# Patient Record
Sex: FEMALE | Race: WHITE | Employment: OTHER | URBAN - METROPOLITAN AREA
[De-identification: names, ages, dates, MRNs, and addresses within clinical notes are randomized per-mention and may not be internally consistent; named-entity substitution may affect disease eponyms.]

---

## 2017-11-09 ENCOUNTER — HOSPITAL ENCOUNTER (OUTPATIENT)
Dept: WOMENS IMAGING | Age: 57
Discharge: HOME | End: 2017-11-09
Attending: FAMILY MEDICINE | Admitting: FAMILY MEDICINE

## 2017-11-09 DIAGNOSIS — Z12.31 VISIT FOR SCREENING MAMMOGRAM: ICD-10-CM

## 2018-11-22 LAB
CHOLESTEROL, TOTAL: 216 MG/DL
CHOLESTEROL/HDL RATIO: ABNORMAL
HDLC SERPL-MCNC: 94 MG/DL (ref 35–70)
LDL CHOLESTEROL CALCULATED: 106 MG/DL (ref 0–160)
TRIGL SERPL-MCNC: 82 MG/DL
TSH SERPL DL<=0.05 MIU/L-ACNC: 1.12 UIU/ML
VLDLC SERPL CALC-MCNC: 16 MG/DL

## 2019-05-23 ENCOUNTER — HOSPITAL ENCOUNTER (OUTPATIENT)
Dept: WOMENS IMAGING | Age: 59
Discharge: HOME | End: 2019-05-23
Payer: COMMERCIAL

## 2019-05-23 DIAGNOSIS — Z12.31 VISIT FOR SCREENING MAMMOGRAM: ICD-10-CM

## 2019-05-23 PROCEDURE — 77063 BREAST TOMOSYNTHESIS BI: CPT

## 2019-05-28 ENCOUNTER — HOSPITAL ENCOUNTER (OUTPATIENT)
Age: 59
Setting detail: SPECIMEN
Discharge: HOME | End: 2019-05-28
Payer: COMMERCIAL

## 2019-05-28 PROCEDURE — 87077 CULTURE AEROBIC IDENTIFY: CPT

## 2019-05-28 PROCEDURE — 87071 CULTURE AEROBIC QUANT OTHER: CPT

## 2019-05-28 PROCEDURE — 87073 CULTURE BACTERIA ANAEROBIC: CPT

## 2019-05-28 PROCEDURE — 87186 SC STD MICRODIL/AGAR DIL: CPT

## 2019-05-31 LAB
CULTURE: ABNORMAL
Lab: ABNORMAL
SPECIMEN: ABNORMAL

## 2019-07-02 ENCOUNTER — ANESTHESIA EVENT (OUTPATIENT)
Dept: OPERATING ROOM | Age: 59
End: 2019-07-02
Payer: COMMERCIAL

## 2019-07-02 NOTE — ANESTHESIA PRE PROCEDURE
Endo/Other:    (+) hyperthyroidism::., .                 Abdominal:           Vascular: negative vascular ROS. Anesthesia Plan      MAC     ASA 2       Induction: intravenous. Anesthetic plan and risks discussed with patient. Use of blood products discussed with patient whom. DEWEY Schroeder - CRNA   7/2/2019       Pre Anesthesia Assessment complete.  Chart reviewed on 7/2/2019

## 2019-07-03 ENCOUNTER — HOSPITAL ENCOUNTER (OUTPATIENT)
Age: 59
Setting detail: OUTPATIENT SURGERY
Discharge: HOME | End: 2019-07-03
Attending: INTERNAL MEDICINE | Admitting: INTERNAL MEDICINE
Payer: COMMERCIAL

## 2019-07-03 ENCOUNTER — ANESTHESIA (OUTPATIENT)
Dept: OPERATING ROOM | Age: 59
End: 2019-07-03
Payer: COMMERCIAL

## 2019-07-03 VITALS
OXYGEN SATURATION: 100 % | WEIGHT: 160 LBS | SYSTOLIC BLOOD PRESSURE: 114 MMHG | DIASTOLIC BLOOD PRESSURE: 78 MMHG | BODY MASS INDEX: 24.25 KG/M2 | HEART RATE: 66 BPM | TEMPERATURE: 98.1 F | RESPIRATION RATE: 16 BRPM | HEIGHT: 68 IN

## 2019-07-03 VITALS — OXYGEN SATURATION: 98 % | DIASTOLIC BLOOD PRESSURE: 58 MMHG | SYSTOLIC BLOOD PRESSURE: 94 MMHG

## 2019-07-03 PROCEDURE — 2709999900 HC NON-CHARGEABLE SUPPLY: Performed by: INTERNAL MEDICINE

## 2019-07-03 PROCEDURE — 3700000000 HC ANESTHESIA ATTENDED CARE: Performed by: INTERNAL MEDICINE

## 2019-07-03 PROCEDURE — 7100000011 HC PHASE II RECOVERY - ADDTL 15 MIN: Performed by: INTERNAL MEDICINE

## 2019-07-03 PROCEDURE — 7100000010 HC PHASE II RECOVERY - FIRST 15 MIN: Performed by: INTERNAL MEDICINE

## 2019-07-03 PROCEDURE — 3700000001 HC ADD 15 MINUTES (ANESTHESIA): Performed by: INTERNAL MEDICINE

## 2019-07-03 PROCEDURE — 6360000002 HC RX W HCPCS: Performed by: NURSE ANESTHETIST, CERTIFIED REGISTERED

## 2019-07-03 PROCEDURE — 2580000003 HC RX 258: Performed by: INTERNAL MEDICINE

## 2019-07-03 PROCEDURE — 88305 TISSUE EXAM BY PATHOLOGIST: CPT

## 2019-07-03 PROCEDURE — 3609010600 HC COLONOSCOPY POLYPECTOMY SNARE/COLD BIOPSY: Performed by: INTERNAL MEDICINE

## 2019-07-03 PROCEDURE — 2500000003 HC RX 250 WO HCPCS: Performed by: NURSE ANESTHETIST, CERTIFIED REGISTERED

## 2019-07-03 RX ORDER — SODIUM CHLORIDE, SODIUM LACTATE, POTASSIUM CHLORIDE, CALCIUM CHLORIDE 600; 310; 30; 20 MG/100ML; MG/100ML; MG/100ML; MG/100ML
INJECTION, SOLUTION INTRAVENOUS CONTINUOUS
Status: DISCONTINUED | OUTPATIENT
Start: 2019-07-03 | End: 2019-07-03 | Stop reason: HOSPADM

## 2019-07-03 RX ORDER — PROPOFOL 10 MG/ML
INJECTION, EMULSION INTRAVENOUS PRN
Status: DISCONTINUED | OUTPATIENT
Start: 2019-07-03 | End: 2019-07-03 | Stop reason: SDUPTHER

## 2019-07-03 RX ORDER — LIDOCAINE HYDROCHLORIDE 20 MG/ML
INJECTION, SOLUTION EPIDURAL; INFILTRATION; INTRACAUDAL; PERINEURAL PRN
Status: DISCONTINUED | OUTPATIENT
Start: 2019-07-03 | End: 2019-07-03 | Stop reason: SDUPTHER

## 2019-07-03 RX ADMIN — PROPOFOL 50 MG: 10 INJECTION, EMULSION INTRAVENOUS at 08:34

## 2019-07-03 RX ADMIN — PROPOFOL 50 MG: 10 INJECTION, EMULSION INTRAVENOUS at 08:27

## 2019-07-03 RX ADMIN — LIDOCAINE HYDROCHLORIDE 200 MG: 20 INJECTION, SOLUTION EPIDURAL; INFILTRATION; INTRACAUDAL; PERINEURAL at 08:08

## 2019-07-03 RX ADMIN — PROPOFOL 50 MG: 10 INJECTION, EMULSION INTRAVENOUS at 08:23

## 2019-07-03 RX ADMIN — PROPOFOL 100 MG: 10 INJECTION, EMULSION INTRAVENOUS at 08:08

## 2019-07-03 RX ADMIN — PROPOFOL 50 MG: 10 INJECTION, EMULSION INTRAVENOUS at 08:13

## 2019-07-03 RX ADMIN — PROPOFOL 50 MG: 10 INJECTION, EMULSION INTRAVENOUS at 08:38

## 2019-07-03 RX ADMIN — PROPOFOL 20 MG: 10 INJECTION, EMULSION INTRAVENOUS at 08:40

## 2019-07-03 RX ADMIN — PROPOFOL 50 MG: 10 INJECTION, EMULSION INTRAVENOUS at 08:42

## 2019-07-03 RX ADMIN — PROPOFOL 50 MG: 10 INJECTION, EMULSION INTRAVENOUS at 08:30

## 2019-07-03 RX ADMIN — SODIUM CHLORIDE, POTASSIUM CHLORIDE, SODIUM LACTATE AND CALCIUM CHLORIDE: 600; 310; 30; 20 INJECTION, SOLUTION INTRAVENOUS at 07:51

## 2019-07-03 RX ADMIN — PROPOFOL 50 MG: 10 INJECTION, EMULSION INTRAVENOUS at 08:17

## 2019-07-03 RX ADMIN — SODIUM CHLORIDE, POTASSIUM CHLORIDE, SODIUM LACTATE AND CALCIUM CHLORIDE: 600; 310; 30; 20 INJECTION, SOLUTION INTRAVENOUS at 08:48

## 2019-07-03 ASSESSMENT — PAIN SCALES - GENERAL
PAINLEVEL_OUTOF10: 0
PAINLEVEL_OUTOF10: 0

## 2019-07-03 ASSESSMENT — PAIN - FUNCTIONAL ASSESSMENT: PAIN_FUNCTIONAL_ASSESSMENT: 0-10

## 2019-07-03 NOTE — BRIEF OP NOTE
Brief Postoperative Note  ______________________________________________________________    Patient: Dana Han  YOB: 1960  MRN: [id number]  Date of Procedure: 7/3/2019    Pre-Op Diagnosis: Screening    Post-Op Diagnosis: diminutive polyp bascending       Procedure(s):  COLONOSCOPY POLYPECTOMY SNARE/COLD BIOPSY    Anesthesia: Monitor Anesthesia Care    Surgeon(s):  Catalino Hensley MD        Estimated Blood Loss (mL): less than 50     Complications: None    Specimens:   ID Type Source Tests Collected by Time Destination   A :  Tissue Colon SURGICAL PATHOLOGY Catalino Hensley MD 7/3/2019 9826                Catalino Hensley MD  Date: 7/3/2019  Time: 8:55 AM

## 2019-08-12 RX ORDER — LEVOTHYROXINE SODIUM 112 UG/1
112 TABLET ORAL EVERY MORNING
Qty: 30 TABLET | Refills: 2 | Status: SHIPPED | OUTPATIENT
Start: 2019-08-12 | End: 2019-11-21 | Stop reason: SDUPTHER

## 2019-08-12 RX ORDER — OXYBUTYNIN CHLORIDE 15 MG/1
30 TABLET, EXTENDED RELEASE ORAL EVERY MORNING
Qty: 60 TABLET | Refills: 2 | Status: SHIPPED | OUTPATIENT
Start: 2019-08-12 | End: 2019-11-21 | Stop reason: SDUPTHER

## 2019-08-12 NOTE — TELEPHONE ENCOUNTER
----- Message from Juanita Olson sent at 8/12/2019 10:28 AM EDT -----  Contact: PATIENT  FOUND IT!  ----- Message -----  From: Bessie Hardin  Sent: 8/12/2019   9:40 AM EDT  To:  Kera Lima MA    MED REFILL:    SYNTHROID 112 MCG    OXYBUTYNIN ER  15 MG 2QD      PHARMACY: [facility] MAIL ORDER

## 2019-10-06 DIAGNOSIS — E05.00 GRAVES DISEASE: ICD-10-CM

## 2019-10-06 DIAGNOSIS — E53.8 VITAMIN B 12 DEFICIENCY: ICD-10-CM

## 2019-10-06 DIAGNOSIS — N32.81 OVERACTIVE BLADDER: ICD-10-CM

## 2019-11-21 ENCOUNTER — OFFICE VISIT (OUTPATIENT)
Dept: FAMILY MEDICINE CLINIC | Age: 59
End: 2019-11-21
Payer: COMMERCIAL

## 2019-11-21 VITALS
DIASTOLIC BLOOD PRESSURE: 80 MMHG | BODY MASS INDEX: 23.7 KG/M2 | HEIGHT: 68 IN | OXYGEN SATURATION: 97 % | WEIGHT: 156.4 LBS | HEART RATE: 87 BPM | SYSTOLIC BLOOD PRESSURE: 119 MMHG

## 2019-11-21 DIAGNOSIS — E05.00 GRAVES DISEASE: ICD-10-CM

## 2019-11-21 DIAGNOSIS — E53.8 VITAMIN B 12 DEFICIENCY: ICD-10-CM

## 2019-11-21 DIAGNOSIS — Z13.1 SCREENING FOR DIABETES MELLITUS: ICD-10-CM

## 2019-11-21 DIAGNOSIS — N32.81 OVERACTIVE BLADDER: ICD-10-CM

## 2019-11-21 DIAGNOSIS — E89.0 POSTOPERATIVE HYPOTHYROIDISM: Primary | ICD-10-CM

## 2019-11-21 DIAGNOSIS — Z13.220 SCREENING CHOLESTEROL LEVEL: ICD-10-CM

## 2019-11-21 LAB
BILIRUBIN, POC: NORMAL
BLOOD URINE, POC: NORMAL
CLARITY, POC: NORMAL
COLOR, POC: YELLOW
GLUCOSE URINE, POC: NORMAL
KETONES, POC: NORMAL
LEUKOCYTE EST, POC: NORMAL
NITRITE, POC: NORMAL
PH, POC: 5
PROTEIN, POC: NORMAL
SPECIFIC GRAVITY, POC: 1.01
UROBILINOGEN, POC: NORMAL

## 2019-11-21 PROCEDURE — 81002 URINALYSIS NONAUTO W/O SCOPE: CPT | Performed by: FAMILY MEDICINE

## 2019-11-21 PROCEDURE — 90686 IIV4 VACC NO PRSV 0.5 ML IM: CPT | Performed by: FAMILY MEDICINE

## 2019-11-21 PROCEDURE — 90471 IMMUNIZATION ADMIN: CPT | Performed by: FAMILY MEDICINE

## 2019-11-21 PROCEDURE — 99213 OFFICE O/P EST LOW 20 MIN: CPT | Performed by: FAMILY MEDICINE

## 2019-11-21 RX ORDER — OXYBUTYNIN CHLORIDE 15 MG/1
30 TABLET, EXTENDED RELEASE ORAL EVERY MORNING
Qty: 60 TABLET | Refills: 2 | Status: SHIPPED | OUTPATIENT
Start: 2019-11-21 | End: 2019-11-27 | Stop reason: SDUPTHER

## 2019-11-21 RX ORDER — LEVOTHYROXINE SODIUM 112 UG/1
112 TABLET ORAL EVERY MORNING
Qty: 30 TABLET | Refills: 2 | Status: SHIPPED | OUTPATIENT
Start: 2019-11-21 | End: 2019-11-27 | Stop reason: SDUPTHER

## 2019-11-21 ASSESSMENT — PATIENT HEALTH QUESTIONNAIRE - PHQ9
SUM OF ALL RESPONSES TO PHQ QUESTIONS 1-9: 0
1. LITTLE INTEREST OR PLEASURE IN DOING THINGS: 0
SUM OF ALL RESPONSES TO PHQ9 QUESTIONS 1 & 2: 0
SUM OF ALL RESPONSES TO PHQ QUESTIONS 1-9: 0
2. FEELING DOWN, DEPRESSED OR HOPELESS: 0

## 2019-11-21 ASSESSMENT — ENCOUNTER SYMPTOMS
CHEST TIGHTNESS: 0
ABDOMINAL PAIN: 0
COUGH: 0
WHEEZING: 0
RESPIRATORY NEGATIVE: 1
SHORTNESS OF BREATH: 0

## 2019-11-24 LAB
ORGANISM: ABNORMAL
URINE CULTURE, ROUTINE: ABNORMAL

## 2019-11-27 DIAGNOSIS — N32.81 OVERACTIVE BLADDER: ICD-10-CM

## 2019-11-27 DIAGNOSIS — E89.0 POSTOPERATIVE HYPOTHYROIDISM: ICD-10-CM

## 2019-11-27 RX ORDER — OXYBUTYNIN CHLORIDE 15 MG/1
30 TABLET, EXTENDED RELEASE ORAL EVERY MORNING
Qty: 180 TABLET | Refills: 1 | Status: SHIPPED | OUTPATIENT
Start: 2019-11-27 | End: 2020-05-08 | Stop reason: SDUPTHER

## 2019-11-27 RX ORDER — LEVOTHYROXINE SODIUM 112 UG/1
112 TABLET ORAL EVERY MORNING
Qty: 90 TABLET | Refills: 1 | Status: SHIPPED | OUTPATIENT
Start: 2019-11-27 | End: 2020-05-08 | Stop reason: SDUPTHER

## 2019-12-06 DIAGNOSIS — E05.00 GRAVES DISEASE: ICD-10-CM

## 2019-12-06 DIAGNOSIS — Z13.1 SCREENING FOR DIABETES MELLITUS: ICD-10-CM

## 2019-12-06 DIAGNOSIS — E53.8 VITAMIN B 12 DEFICIENCY: ICD-10-CM

## 2019-12-06 LAB
A/G RATIO: 1.3 (ref 1.1–2.2)
ALBUMIN SERPL-MCNC: 4.4 G/DL (ref 3.4–5)
ALP BLD-CCNC: 63 U/L (ref 40–129)
ALT SERPL-CCNC: 14 U/L (ref 10–40)
ANION GAP SERPL CALCULATED.3IONS-SCNC: 15 MMOL/L (ref 3–16)
AST SERPL-CCNC: 19 U/L (ref 15–37)
BILIRUB SERPL-MCNC: 1.2 MG/DL (ref 0–1)
BUN BLDV-MCNC: 18 MG/DL (ref 7–20)
CALCIUM SERPL-MCNC: 9.8 MG/DL (ref 8.3–10.6)
CHLORIDE BLD-SCNC: 101 MMOL/L (ref 99–110)
CHOLESTEROL, TOTAL: 206 MG/DL (ref 0–199)
CO2: 24 MMOL/L (ref 21–32)
CREAT SERPL-MCNC: 0.6 MG/DL (ref 0.6–1.2)
GFR AFRICAN AMERICAN: >60
GFR NON-AFRICAN AMERICAN: >60
GLOBULIN: 3.3 G/DL
GLUCOSE BLD-MCNC: 88 MG/DL (ref 70–99)
HDLC SERPL-MCNC: 92 MG/DL (ref 40–60)
LDL CHOLESTEROL CALCULATED: 103 MG/DL
POTASSIUM SERPL-SCNC: 4.1 MMOL/L (ref 3.5–5.1)
SODIUM BLD-SCNC: 140 MMOL/L (ref 136–145)
T4 FREE: 1.8 NG/DL (ref 0.9–1.8)
TOTAL PROTEIN: 7.7 G/DL (ref 6.4–8.2)
TRIGL SERPL-MCNC: 56 MG/DL (ref 0–150)
TSH SERPL DL<=0.05 MIU/L-ACNC: 0.3 UIU/ML (ref 0.27–4.2)
VITAMIN B-12: 724 PG/ML (ref 211–911)
VLDLC SERPL CALC-MCNC: 11 MG/DL

## 2019-12-12 ENCOUNTER — TELEPHONE (OUTPATIENT)
Dept: FAMILY MEDICINE CLINIC | Age: 59
End: 2019-12-12

## 2020-01-20 ENCOUNTER — OFFICE VISIT (OUTPATIENT)
Dept: FAMILY MEDICINE CLINIC | Age: 60
End: 2020-01-20
Payer: COMMERCIAL

## 2020-01-20 VITALS
OXYGEN SATURATION: 96 % | SYSTOLIC BLOOD PRESSURE: 118 MMHG | DIASTOLIC BLOOD PRESSURE: 74 MMHG | TEMPERATURE: 99.2 F | HEIGHT: 68 IN | BODY MASS INDEX: 23.95 KG/M2 | HEART RATE: 85 BPM | WEIGHT: 158 LBS

## 2020-01-20 PROCEDURE — 99213 OFFICE O/P EST LOW 20 MIN: CPT | Performed by: NURSE PRACTITIONER

## 2020-01-20 RX ORDER — BENZONATATE 100 MG/1
100 CAPSULE ORAL 3 TIMES DAILY PRN
Qty: 20 CAPSULE | Refills: 0 | Status: SHIPPED | OUTPATIENT
Start: 2020-01-20 | End: 2020-05-08 | Stop reason: ALTCHOICE

## 2020-01-20 RX ORDER — AMOXICILLIN AND CLAVULANATE POTASSIUM 875; 125 MG/1; MG/1
1 TABLET, FILM COATED ORAL 2 TIMES DAILY
Qty: 20 TABLET | Refills: 0 | Status: SHIPPED | OUTPATIENT
Start: 2020-01-20 | End: 2020-01-30

## 2020-01-20 RX ORDER — FLUTICASONE PROPIONATE 50 MCG
1 SPRAY, SUSPENSION (ML) NASAL DAILY
Qty: 1 BOTTLE | Refills: 0 | Status: SHIPPED | OUTPATIENT
Start: 2020-01-20 | End: 2020-05-08

## 2020-01-20 ASSESSMENT — ENCOUNTER SYMPTOMS
SINUS PRESSURE: 1
HEARTBURN: 0
COUGH: 1
SHORTNESS OF BREATH: 0
SINUS PAIN: 1
HEMOPTYSIS: 0
VOMITING: 0
CHEST TIGHTNESS: 0
SORE THROAT: 0
DIARRHEA: 0
RHINORRHEA: 1
NAUSEA: 0
WHEEZING: 1

## 2020-01-20 NOTE — PROGRESS NOTES
benzonatate (TESSALON PERLES) 100 MG capsule Take 1 capsule by mouth 3 times daily as needed for Cough 20 capsule 0    levothyroxine (LEVOTHROID) 112 MCG tablet Take 1 tablet by mouth every morning Must make appt for future refills 90 tablet 1    oxybutynin (DITROPAN XL) 15 MG extended release tablet Take 2 tablets by mouth every morning Must make appt for future refills 180 tablet 1    Cyanocobalamin (B-12 PO) Take 1,500 mcg by mouth every morning        No current facility-administered medications for this visit. Past medical, family,surgical history reviewed today. Objective:  /74   Pulse 85   Temp 99.2 °F (37.3 °C)   Ht 5' 8\" (1.727 m)   Wt 158 lb (71.7 kg)   SpO2 96%   BMI 24.02 kg/m²   BP Readings from Last 3 Encounters:   01/20/20 118/74   11/21/19 119/80   07/03/19 (!) 94/58     Wt Readings from Last 3 Encounters:   01/20/20 158 lb (71.7 kg)   11/21/19 156 lb 6.4 oz (70.9 kg)   07/03/19 160 lb (72.6 kg)         Physical Exam  Constitutional:       Appearance: Normal appearance. HENT:      Head: Normocephalic. Right Ear: Tympanic membrane, ear canal and external ear normal.      Left Ear: Tympanic membrane, ear canal and external ear normal.      Nose: Mucosal edema and congestion present. Right Sinus: Frontal sinus tenderness present. No maxillary sinus tenderness. Left Sinus: Frontal sinus tenderness present. No maxillary sinus tenderness. Mouth/Throat:      Lips: Pink. Mouth: Mucous membranes are moist.      Pharynx: Oropharynx is clear. Neck:      Musculoskeletal: Neck supple. Cardiovascular:      Rate and Rhythm: Normal rate and regular rhythm. Heart sounds: Normal heart sounds. Pulmonary:      Breath sounds: Normal breath sounds. Skin:     General: Skin is warm and dry. Neurological:      Mental Status: She is alert and oriented to person, place, and time.    Psychiatric:         Mood and Affect: Mood normal.         Behavior: Behavior normal.         Lab Results   Component Value Date    WBC 6.1 09/23/2015    HGB 12.5 09/23/2015    HCT 37.5 09/23/2015    MCV 91.5 09/23/2015     09/23/2015     Lab Results   Component Value Date     12/06/2019    K 4.1 12/06/2019     12/06/2019    CO2 24 12/06/2019    BUN 18 12/06/2019    CREATININE 0.6 12/06/2019    GLUCOSE 88 12/06/2019    CALCIUM 9.8 12/06/2019    PROT 7.7 12/06/2019    LABALBU 4.4 12/06/2019    BILITOT 1.2 (H) 12/06/2019    ALKPHOS 63 12/06/2019    AST 19 12/06/2019    ALT 14 12/06/2019    LABGLOM >60 12/06/2019    GFRAA >60 12/06/2019    AGRATIO 1.3 12/06/2019    GLOB 3.3 12/06/2019     Lab Results   Component Value Date    CHOL 206 (H) 12/06/2019    CHOL 216 (H) 11/20/2018     Lab Results   Component Value Date    TRIG 56 12/06/2019    TRIG 82 11/20/2018     Lab Results   Component Value Date    HDL 92 (H) 12/06/2019    HDL 94 11/20/2018     Lab Results   Component Value Date    LDLCALC 103 (H) 12/06/2019    LDLCALC 106 11/20/2018     No results found for: LABA1C  Lab Results   Component Value Date    TSH 0.30 12/06/2019         ASSESSMENT/PLAN:      1. Acute bacterial sinusitis  Increase fluids. - amoxicillin-clavulanate (AUGMENTIN) 875-125 MG per tablet; Take 1 tablet by mouth 2 times daily for 10 days  Dispense: 20 tablet; Refill: 0  - fluticasone (FLONASE) 50 MCG/ACT nasal spray; 1 spray by Nasal route daily  Dispense: 1 Bottle; Refill: 0    2. Cough  - benzonatate (TESSALON PERLES) 100 MG capsule; Take 1 capsule by mouth 3 times daily as needed for Cough  Dispense: 20 capsule; Refill: 0          There are no discontinued medications. No orders of the defined types were placed in this encounter. Care discussed with patient. Questions answered. Patient verbalizes understanding and agrees with plan. After visit summary provided. Advised to call for any problems, questions, or concerns. Return if symptoms worsen or fail to improve.

## 2020-05-08 ENCOUNTER — TELEMEDICINE (OUTPATIENT)
Dept: FAMILY MEDICINE CLINIC | Age: 60
End: 2020-05-08
Payer: COMMERCIAL

## 2020-05-08 VITALS
HEIGHT: 68 IN | DIASTOLIC BLOOD PRESSURE: 80 MMHG | SYSTOLIC BLOOD PRESSURE: 120 MMHG | BODY MASS INDEX: 23.95 KG/M2 | WEIGHT: 158 LBS

## 2020-05-08 PROBLEM — J30.1 SEASONAL ALLERGIC RHINITIS DUE TO POLLEN: Chronic | Status: ACTIVE | Noted: 2020-05-08

## 2020-05-08 PROCEDURE — 99213 OFFICE O/P EST LOW 20 MIN: CPT | Performed by: FAMILY MEDICINE

## 2020-05-08 RX ORDER — OXYBUTYNIN CHLORIDE 15 MG/1
30 TABLET, EXTENDED RELEASE ORAL EVERY MORNING
Qty: 180 TABLET | Refills: 1 | Status: SHIPPED | OUTPATIENT
Start: 2020-05-08 | End: 2020-11-09 | Stop reason: SDUPTHER

## 2020-05-08 RX ORDER — FLUTICASONE PROPIONATE 50 MCG
2 SPRAY, SUSPENSION (ML) NASAL DAILY
Qty: 3 BOTTLE | Refills: 1 | Status: SHIPPED | OUTPATIENT
Start: 2020-05-08 | End: 2020-11-09 | Stop reason: SDUPTHER

## 2020-05-08 RX ORDER — LEVOTHYROXINE SODIUM 112 UG/1
112 TABLET ORAL EVERY MORNING
Qty: 90 TABLET | Refills: 1 | Status: SHIPPED | OUTPATIENT
Start: 2020-05-08 | End: 2020-11-09 | Stop reason: SDUPTHER

## 2020-05-08 ASSESSMENT — ENCOUNTER SYMPTOMS
SHORTNESS OF BREATH: 0
RHINORRHEA: 1

## 2020-05-08 ASSESSMENT — PATIENT HEALTH QUESTIONNAIRE - PHQ9
1. LITTLE INTEREST OR PLEASURE IN DOING THINGS: 0
SUM OF ALL RESPONSES TO PHQ QUESTIONS 1-9: 0
2. FEELING DOWN, DEPRESSED OR HOPELESS: 0
SUM OF ALL RESPONSES TO PHQ9 QUESTIONS 1 & 2: 0
SUM OF ALL RESPONSES TO PHQ QUESTIONS 1-9: 0

## 2020-05-08 NOTE — PROGRESS NOTES
Historical Provider, MD       Social History     Tobacco Use    Smoking status: Never Smoker    Smokeless tobacco: Never Used   Substance Use Topics    Alcohol use: Yes     Comment: moderate    Drug use: No            PHYSICAL EXAMINATION:  [ INSTRUCTIONS:  \"[x]\" Indicates a positive item  \"[]\" Indicates a negative item  -- DELETE ALL ITEMS NOT EXAMINED]  Vital Signs: (As obtained by patient/caregiver or practitioner observation)  Wt 158  Blood pressure- 120/80 Heart rate-    Respiratory rate-    Temperature-  Pulse oximetry-     Constitutional: [x] Appears well-developed and well-nourished [x] No apparent distress      [] Abnormal-   Mental status  [x] Alert and awake  [x] Oriented to person/place/time [x]Able to follow commands      Eyes:  EOM    []  Normal  [] Abnormal-  Sclera  []  Normal  [] Abnormal -         Discharge []  None visible  [] Abnormal -    HENT:   [x] Normocephalic, atraumatic. [] Abnormal   [] Mouth/Throat: Mucous membranes are moist.     External Ears [x] Normal  [] Abnormal-     Neck: [x] No visualized mass     Pulmonary/Chest: [] Respiratory effort normal.  [] No visualized signs of difficulty breathing or respiratory distress        [] Abnormal-      Musculoskeletal:   [] Normal gait with no signs of ataxia         [] Normal range of motion of neck        [] Abnormal-       Neurological:        [x] No Facial Asymmetry (Cranial nerve 7 motor function) (limited exam to video visit)          [] No gaze palsy        [] Abnormal-         Skin:        [x] No significant exanthematous lesions or discoloration noted on facial skin         [] Abnormal-            Psychiatric:       [x] Normal Affect [] No Hallucinations        [] Abnormal-     Other pertinent observable physical exam findings-     ASSESSMENT/PLAN:  1. Overactive bladder  Same treatment  - oxybutynin (DITROPAN XL) 15 MG extended release tablet; Take 2 tablets by mouth every morning  Dispense: 180 tablet; Refill: 1    2.

## 2020-08-07 ENCOUNTER — OFFICE VISIT (OUTPATIENT)
Dept: FAMILY MEDICINE CLINIC | Age: 60
End: 2020-08-07
Payer: COMMERCIAL

## 2020-08-07 VITALS
HEART RATE: 76 BPM | SYSTOLIC BLOOD PRESSURE: 119 MMHG | OXYGEN SATURATION: 98 % | RESPIRATION RATE: 16 BRPM | WEIGHT: 168 LBS | BODY MASS INDEX: 25.46 KG/M2 | DIASTOLIC BLOOD PRESSURE: 81 MMHG | HEIGHT: 68 IN

## 2020-08-07 PROCEDURE — 99213 OFFICE O/P EST LOW 20 MIN: CPT | Performed by: FAMILY MEDICINE

## 2020-08-07 NOTE — PROGRESS NOTES
2020     Renetta Powell      Chief Complaint   Patient presents with    Edema     left lower leg with bruising sx started 1 month ago        HPI      Jarocho Belcher is a 56 y.o. female who presents today with the followin. Osteoarthritis of knee, unspecified laterality, unspecified osteoarthritis type    2. Edema leg    3. Leg edema, left      Concerned about swelling and discoloration of her left leg  She has been standing more than usual because of a job change  She noticed some petechial hemorrhages around her left ankle but she is also been having some pain in the anterior and posterior aspect of her lower leg  She never had a DVT  She had arthroscopic knee surgery but spent quite some time since that was done  She has no any shortness of breath cough or hemoptysis  She concerned about having a blood clot in her leg    REVIEW OF SYMPTOMS    Review of Systems    PAST MEDICAL HISTORY  Past Medical History:   Diagnosis Date    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     Cancer ([address]     back of arm right    Difficult intubation     Graves disease     Hypothyroidism     IUD (intrauterine device) in place 2008    Mirena    Overactive bladder     Pap smear for cervical cancer screening 2010    Neg    Pap smear for cervical cancer screening 2010    Neg    Pap smear for cervical cancer screening 2011    Neg    Vitamin B 12 deficiency        FAMILY HISTORY  Family History   Problem Relation Age of Onset    Heart Failure Mother     Cancer Maternal Aunt     Stroke Maternal Grandmother        SOCIAL HISTORY  Social History     Socioeconomic History    Marital status:      Spouse name: Not on file    Number of children: Not on file    Years of education: Not on file    Highest education level: Not on file   Occupational History    Occupation:    Social Needs    Financial resource strain: Not on file    Food insecurity     Worry: Not on file     Inability: Not on file    Transportation needs     Medical: Not on file     Non-medical: Not on file   Tobacco Use    Smoking status: Never Smoker    Smokeless tobacco: Never Used   Substance and Sexual Activity    Alcohol use: Yes     Comment: moderate    Drug use: No    Sexual activity: Yes     Partners: Male   Lifestyle    Physical activity     Days per week: Not on file     Minutes per session: Not on file    Stress: Not on file   Relationships    Social connections     Talks on phone: Not on file     Gets together: Not on file     Attends Jew service: Not on file     Active member of club or organization: Not on file     Attends meetings of clubs or organizations: Not on file     Relationship status: Not on file    Intimate partner violence     Fear of current or ex partner: Not on file     Emotionally abused: Not on file     Physically abused: Not on file     Forced sexual activity: Not on file   Other Topics Concern    Not on file   Social History Narrative    Not on file        SURGICAL HISTORY  Past Surgical History:   Procedure Laterality Date    APPENDECTOMY  9/22/15    laprascopic    COLONOSCOPY  08/2008    Neg    COLONOSCOPY  07/03/2019    polyps divertics 5-10 year repeat    COLONOSCOPY N/A 7/3/2019    COLONOSCOPY POLYPECTOMY SNARE/COLD BIOPSY performed by Theresa Nichole MD at [facility]  12/2005    HGSIL    COLPOSCOPY  12/2007    HPV, poss.  mild dysplasia    GYNECOLOGIC CRYOSURGERY  02/09/2006    KNEE ARTHROSCOPY Right 11/2009    Dr. Isabel Woods Right     arm    WRIST GANGLION EXCISION Left        CURRENT MEDICATIONS  Current Outpatient Medications   Medication Sig Dispense Refill    oxybutynin (DITROPAN XL) 15 MG extended release tablet Take 2 tablets by mouth every morning 180 tablet 1    levothyroxine (LEVOTHROID) 112 MCG tablet Take 1 tablet by mouth every morning 90 tablet 1    fluticasone (FLONASE) 50 MCG/ACT nasal spray 2 sprays by Each Nostril route

## 2020-08-17 ENCOUNTER — HOSPITAL ENCOUNTER (OUTPATIENT)
Dept: ULTRASOUND IMAGING | Age: 60
Discharge: HOME | End: 2020-08-17
Payer: COMMERCIAL

## 2020-08-17 ENCOUNTER — TELEPHONE (OUTPATIENT)
Dept: FAMILY MEDICINE CLINIC | Age: 60
End: 2020-08-17

## 2020-08-17 PROCEDURE — 93971 EXTREMITY STUDY: CPT

## 2020-11-04 ENCOUNTER — HOSPITAL ENCOUNTER (OUTPATIENT)
Dept: WOMENS IMAGING | Age: 60
Discharge: HOME | End: 2020-11-04
Payer: COMMERCIAL

## 2020-11-04 PROCEDURE — 77063 BREAST TOMOSYNTHESIS BI: CPT

## 2020-11-09 ENCOUNTER — TELEMEDICINE (OUTPATIENT)
Dept: FAMILY MEDICINE CLINIC | Age: 60
End: 2020-11-09
Payer: COMMERCIAL

## 2020-11-09 PROCEDURE — 99214 OFFICE O/P EST MOD 30 MIN: CPT | Performed by: FAMILY MEDICINE

## 2020-11-09 RX ORDER — LEVOTHYROXINE SODIUM 112 UG/1
112 TABLET ORAL EVERY MORNING
Qty: 90 TABLET | Refills: 1 | Status: SHIPPED | OUTPATIENT
Start: 2020-11-09 | End: 2021-04-30 | Stop reason: SDUPTHER

## 2020-11-09 RX ORDER — FLUTICASONE PROPIONATE 50 MCG
2 SPRAY, SUSPENSION (ML) NASAL DAILY
Qty: 3 BOTTLE | Refills: 1 | Status: SHIPPED | OUTPATIENT
Start: 2020-11-09 | End: 2021-04-30 | Stop reason: SDUPTHER

## 2020-11-09 RX ORDER — OXYBUTYNIN CHLORIDE 15 MG/1
30 TABLET, EXTENDED RELEASE ORAL EVERY MORNING
Qty: 180 TABLET | Refills: 1 | Status: SHIPPED | OUTPATIENT
Start: 2020-11-09 | End: 2021-04-30 | Stop reason: SDUPTHER

## 2020-11-09 ASSESSMENT — ENCOUNTER SYMPTOMS
SHORTNESS OF BREATH: 0
COUGH: 0
BACK PAIN: 1
SORE THROAT: 0

## 2020-11-09 NOTE — PROGRESS NOTES
2020     [facility]      Chief Complaint   Patient presents with    6 Month Follow-Up     pt is seeing a chiropractor for knee which seems to be connected to the Martha Fernie is a 56 y.o. female who presents today with the followin. Seasonal allergic rhinitis due to pollen    2. Postoperative hypothyroidism    3. Overactive bladder    4. Vitamin B 12 deficiency    5. Screening cholesterol level    6. Screening for diabetes mellitus    7. Lumbar radiculopathy, acute    She is here for follow-up actually is a virtual visit  The patient been seeing a chiropractor for what appears now to be sciatica on the left leg  She had some pain and I ruled out a DVT with a Doppler then the pain started as high as her back went down into her ankle and she went to a chiropractor and he probably is correct and the same lumbar radiculopathy he is doing manipulation treatments on her back she is slowly getting better her pain is 1-2 out of 10  It does not limit her activity    REVIEW OF SYMPTOMS    Review of Systems   Constitutional: Positive for activity change. Negative for fever and unexpected weight change. She works as a   She is taking precaution for [employer]  She has no symptoms or known exposure   HENT: Negative for congestion and sore throat. Respiratory: Negative for cough and shortness of breath. Cardiovascular: Negative for chest pain. Genitourinary: Positive for frequency. Patient has a long history of overactive bladder  She is on high dose of oxybutynin and doing well on that wants to stay on it   Musculoskeletal: Positive for back pain. Neurological: Negative. Psychiatric/Behavioral: Negative. PAST MEDICAL HISTORY  Past Medical History:   Diagnosis Date    Aneurysm [facility])     behind right eye.     Cancer ([address]     back of arm right    Difficult intubation     Graves disease     Hypothyroidism     IUD (intrauterine device) in place 02/25/2008    Mirena    Overactive bladder     Pap smear for cervical cancer screening 06/03/2010    Neg    Pap smear for cervical cancer screening 12/02/2010    Neg    Pap smear for cervical cancer screening 12/06/2011    Neg    Vitamin B 12 deficiency        FAMILY HISTORY  Family History   Problem Relation Age of Onset    Heart Failure Mother     Cancer Maternal Aunt     Stroke Maternal Grandmother        SOCIAL HISTORY  Social History     Socioeconomic History    Marital status:      Spouse name: Not on file    Number of children: Not on file    Years of education: Not on file    Highest education level: Not on file   Occupational History    Occupation:    Social Needs    Financial resource strain: Not on file    Food insecurity     Worry: Not on file     Inability: Not on file   [employer] needs     Medical: Not on file     Non-medical: Not on file   Tobacco Use    Smoking status: Never Smoker    Smokeless tobacco: Never Used   Substance and Sexual Activity    Alcohol use: Yes     Comment: moderate    Drug use: No    Sexual activity: Yes     Partners: Male   Lifestyle    Physical activity     Days per week: Not on file     Minutes per session: Not on file    Stress: Not on file   Relationships    Social connections     Talks on phone: Not on file     Gets together: Not on file     Attends [employer] service: Not on file     Active member of club or organization: Not on file     Attends meetings of clubs or organizations: Not on file     Relationship status: Not on file    Intimate partner violence     Fear of current or ex partner: Not on file     Emotionally abused: Not on file     Physically abused: Not on file     Forced sexual activity: Not on file   Other Topics Concern    Not on file   Social History Narrative    Not on file        SURGICAL HISTORY  Past Surgical History:   Procedure Laterality Date    APPENDECTOMY  9/22/15    laprascopic    COLONOSCOPY  08/2008    Neg    COLONOSCOPY  07/03/2019    polyps divertics 5-10 year repeat    COLONOSCOPY N/A 7/3/2019    COLONOSCOPY POLYPECTOMY SNARE/COLD BIOPSY performed by Evette Simon MD at [address]  12/2005    HGSIL    COLPOSCOPY  12/2007    HPV, poss. mild dysplasia    GYNECOLOGIC CRYOSURGERY  02/09/2006    KNEE ARTHROSCOPY Right 11/2009    Dr. Talisha Mcclelland Right     arm    WRIST GANGLION EXCISION Left        CURRENT MEDICATIONS  Current Outpatient Medications   Medication Sig Dispense Refill    fluticasone (FLONASE) 50 MCG/ACT nasal spray 2 sprays by Each Nostril route daily 3 Bottle 1    levothyroxine (LEVOTHROID) 112 MCG tablet Take 1 tablet by mouth every morning 90 tablet 1    oxybutynin (DITROPAN XL) 15 MG extended release tablet Take 2 tablets by mouth every morning 180 tablet 1    Cyanocobalamin (B-12 PO) Take 1,500 mcg by mouth every morning        No current facility-administered medications for this visit. ALLERGIES  Allergies   Allergen Reactions    Latex Rash     tpae    Pollen Extract     Tape [Adhesive Tape] Rash       PHYSICAL EXAM    There were no vitals taken for this visit. Physical Exam  Vitals signs and nursing note reviewed. Constitutional:       Appearance: Normal appearance. Pulmonary:      Effort: Pulmonary effort is normal. No respiratory distress. Neurological:      General: No focal deficit present. Mental Status: She is alert. Psychiatric:         Mood and Affect: Mood normal.     Patient is doing well  We will continue her same medications    Continue chiropractic care  Recommend flu vaccine and COVID-19 vaccine when available  Follow-up in 6 months    ASSESSMENT and Jennie Haddad was seen today for 6 month follow-up. Diagnoses and all orders for this visit:    Seasonal allergic rhinitis due to pollen    Postoperative hypothyroidism  -     levothyroxine (LEVOTHROID) 112 MCG tablet;  Take 1 tablet by mouth

## 2020-12-02 ENCOUNTER — HOSPITAL ENCOUNTER (OUTPATIENT)
Age: 60
Discharge: HOME | End: 2020-12-02
Payer: COMMERCIAL

## 2020-12-02 LAB
ALBUMIN SERPL-MCNC: 4 GM/DL (ref 3.4–5)
ALP BLD-CCNC: 77 IU/L (ref 40–129)
ALT SERPL-CCNC: 17 U/L (ref 10–40)
ANION GAP SERPL CALCULATED.3IONS-SCNC: 9 MMOL/L (ref 4–16)
AST SERPL-CCNC: 22 IU/L (ref 15–37)
BILIRUB SERPL-MCNC: 1.2 MG/DL (ref 0–1)
BUN BLDV-MCNC: 14 MG/DL (ref 6–23)
CALCIUM SERPL-MCNC: 9.3 MG/DL (ref 8.3–10.6)
CHLORIDE BLD-SCNC: 102 MMOL/L (ref 99–110)
CHOLESTEROL: 215 MG/DL
CO2: 28 MMOL/L (ref 21–32)
CREAT SERPL-MCNC: 0.6 MG/DL (ref 0.6–1.1)
GFR AFRICAN AMERICAN: >60 ML/MIN/1.73M2
GFR NON-AFRICAN AMERICAN: >60 ML/MIN/1.73M2
GLUCOSE BLD-MCNC: 87 MG/DL (ref 70–99)
HDLC SERPL-MCNC: 98 MG/DL
LDL CHOLESTEROL DIRECT: 113 MG/DL
POTASSIUM SERPL-SCNC: 4.2 MMOL/L (ref 3.5–5.1)
SODIUM BLD-SCNC: 139 MMOL/L (ref 135–145)
T4 FREE: 1.59 NG/DL (ref 0.9–1.8)
TOTAL PROTEIN: 7.1 GM/DL (ref 6.4–8.2)
TRIGL SERPL-MCNC: 60 MG/DL
TSH HIGH SENSITIVITY: 0.35 UIU/ML (ref 0.27–4.2)
VITAMIN B-12: 1400 PG/ML (ref 211–911)

## 2020-12-02 PROCEDURE — 80061 LIPID PANEL: CPT

## 2020-12-02 PROCEDURE — 80053 COMPREHEN METABOLIC PANEL: CPT

## 2020-12-02 PROCEDURE — 83721 ASSAY OF BLOOD LIPOPROTEIN: CPT

## 2020-12-02 PROCEDURE — 84443 ASSAY THYROID STIM HORMONE: CPT

## 2020-12-02 PROCEDURE — 82607 VITAMIN B-12: CPT

## 2020-12-02 PROCEDURE — 36415 COLL VENOUS BLD VENIPUNCTURE: CPT

## 2020-12-02 PROCEDURE — 84439 ASSAY OF FREE THYROXINE: CPT

## 2021-04-30 ENCOUNTER — TELEMEDICINE (OUTPATIENT)
Dept: FAMILY MEDICINE CLINIC | Age: 61
End: 2021-04-30
Payer: COMMERCIAL

## 2021-04-30 DIAGNOSIS — N32.81 OVERACTIVE BLADDER: ICD-10-CM

## 2021-04-30 DIAGNOSIS — E53.8 VITAMIN B 12 DEFICIENCY: ICD-10-CM

## 2021-04-30 DIAGNOSIS — E89.0 POSTOPERATIVE HYPOTHYROIDISM: ICD-10-CM

## 2021-04-30 PROCEDURE — 99213 OFFICE O/P EST LOW 20 MIN: CPT | Performed by: FAMILY MEDICINE

## 2021-04-30 RX ORDER — FLUTICASONE PROPIONATE 50 MCG
2 SPRAY, SUSPENSION (ML) NASAL DAILY
Qty: 3 BOTTLE | Refills: 3 | Status: SHIPPED | OUTPATIENT
Start: 2021-04-30 | End: 2022-03-25 | Stop reason: SDUPTHER

## 2021-04-30 RX ORDER — OXYBUTYNIN CHLORIDE 15 MG/1
30 TABLET, EXTENDED RELEASE ORAL EVERY MORNING
Qty: 180 TABLET | Refills: 3 | Status: SHIPPED | OUTPATIENT
Start: 2021-04-30 | End: 2022-03-25 | Stop reason: SDUPTHER

## 2021-04-30 RX ORDER — LEVOTHYROXINE SODIUM 112 UG/1
112 TABLET ORAL EVERY MORNING
Qty: 90 TABLET | Refills: 3 | Status: SHIPPED | OUTPATIENT
Start: 2021-04-30 | End: 2022-03-25 | Stop reason: SDUPTHER

## 2021-04-30 RX ORDER — LEVOTHYROXINE SODIUM 112 UG/1
112 TABLET ORAL EVERY MORNING
Qty: 90 TABLET | Refills: 1 | Status: SHIPPED | OUTPATIENT
Start: 2021-04-30 | End: 2021-04-30

## 2021-04-30 RX ORDER — FLUTICASONE PROPIONATE 50 MCG
2 SPRAY, SUSPENSION (ML) NASAL DAILY
Qty: 3 BOTTLE | Refills: 1 | Status: SHIPPED | OUTPATIENT
Start: 2021-04-30 | End: 2021-04-30

## 2021-04-30 RX ORDER — OXYBUTYNIN CHLORIDE 15 MG/1
30 TABLET, EXTENDED RELEASE ORAL EVERY MORNING
Qty: 180 TABLET | Refills: 1 | Status: SHIPPED | OUTPATIENT
Start: 2021-04-30 | End: 2021-04-30

## 2021-04-30 ASSESSMENT — PATIENT HEALTH QUESTIONNAIRE - PHQ9
SUM OF ALL RESPONSES TO PHQ QUESTIONS 1-9: 0
SUM OF ALL RESPONSES TO PHQ9 QUESTIONS 1 & 2: 0
SUM OF ALL RESPONSES TO PHQ QUESTIONS 1-9: 0

## 2021-04-30 NOTE — PROGRESS NOTES
2021    TELEHEALTH EVALUATION -- Audio/Visual (During YES-62 public health emergency)    HPI:    Alpha Busing (:  1960) has requested an audio/video evaluation for the following concern(s):    Video visit for follow-up on 2 problems  The patient has a long history of hypothyroidism is is been stable on levothyroxine replacement therapy  She previously had thyroid surgery    Review of Systems   Genitourinary:        History of overactive bladder she is doing well on oxybutynin ER and wants to stay on that   Hematological:        Has a documented vitamin B12 deficiency is taking supplemental vitamin D       Prior to Visit Medications    Medication Sig Taking? Authorizing Provider   fluticasone (FLONASE) 50 MCG/ACT nasal spray 2 sprays by Each Nostril route daily Yes Scout Perdue MD   levothyroxine (SYNTHROID) 112 MCG tablet Take 1 tablet by mouth every morning Yes Scout Perdue MD   oxybutynin (DITROPAN XL) 15 MG extended release tablet Take 2 tablets by mouth every morning Yes Scout Perdue MD   Cyanocobalamin (B-12 PO) Take 1,500 mcg by mouth every morning  Yes Historical Provider, MD       Social History     Tobacco Use    Smoking status: Never Smoker    Smokeless tobacco: Never Used   Substance Use Topics    Alcohol use: Yes     Comment: moderate    Drug use:  No            PHYSICAL EXAMINATION:  [ INSTRUCTIONS:  \"[x]\" Indicates a positive item  \"[]\" Indicates a negative item  -- DELETE ALL ITEMS NOT EXAMINED]  Vital Signs: (As obtained by patient/caregiver or practitioner observation)    Blood pressure-  Heart rate-    Respiratory rate-    Temperature-  Pulse oximetry-     Constitutional: [x] Appears well-developed and well-nourished [x] No apparent distress      [] Abnormal-   Mental status  [x] Alert and awake  [] Oriented to person/place/time []Able to follow commands      Eyes:  EOM    []  Normal  [] Abnormal-  Sclera  []  Normal  [] Abnormal -         Discharge []  None visible  [] Abnormal -    HENT:   [] Normocephalic, atraumatic. [] Abnormal   [] Mouth/Throat: Mucous membranes are moist.     External Ears [] Normal  [] Abnormal-     Neck: [] No visualized mass     Pulmonary/Chest: [x] Respiratory effort normal.  [] No visualized signs of difficulty breathing or respiratory distress        [] Abnormal-      Musculoskeletal:   [] Normal gait with no signs of ataxia         [] Normal range of motion of neck        [] Abnormal-       Neurological:        [] No Facial Asymmetry (Cranial nerve 7 motor function) (limited exam to video visit)          [] No gaze palsy        [] Abnormal-         Skin:        [] No significant exanthematous lesions or discoloration noted on facial skin         [] Abnormal-            Psychiatric:       [] Normal Affect [] No Hallucinations        [] Abnormal-     Other pertinent observable physical exam findings-   Reviewed immunizations   Reviewed most recent labs     ASSESSMENT/PLAN:  1. Postoperative hypothyroidism    - levothyroxine (SYNTHROID) 112 MCG tablet; Take 1 tablet by mouth every morning  Dispense: 90 tablet; Refill: 1    2. Overactive bladder    - oxybutynin (DITROPAN XL) 15 MG extended release tablet; Take 2 tablets by mouth every morning  Dispense: 180 tablet; Refill: 1  Renewed medicines for a year  Follow-up then    No follow-ups on file. Bala Scott, was evaluated through a synchronous (real-time) audio-video encounter. The patient (or guardian if applicable) is aware that this is a billable service. Verbal consent to proceed has been obtained within the past 12 months. The visit was conducted pursuant to the emergency declaration under the [address] [place] [address] authority and the [employer] and [employer] General Act. Patient identification was verified, and a caregiver was present when appropriate.  The patient was located in a state where the provider was

## 2021-06-26 ENCOUNTER — HOSPITAL ENCOUNTER (EMERGENCY)
Age: 61
Discharge: HOME | End: 2021-06-27
Attending: EMERGENCY MEDICINE
Payer: COMMERCIAL

## 2021-06-26 ENCOUNTER — APPOINTMENT (OUTPATIENT)
Dept: GENERAL RADIOLOGY | Age: 61
End: 2021-06-26
Payer: COMMERCIAL

## 2021-06-26 VITALS
WEIGHT: 168 LBS | BODY MASS INDEX: 25.46 KG/M2 | HEIGHT: 68 IN | SYSTOLIC BLOOD PRESSURE: 136 MMHG | OXYGEN SATURATION: 99 % | HEART RATE: 65 BPM | RESPIRATION RATE: 18 BRPM | TEMPERATURE: 98 F | DIASTOLIC BLOOD PRESSURE: 85 MMHG

## 2021-06-26 DIAGNOSIS — S51.812A LACERATION OF LEFT FOREARM, INITIAL ENCOUNTER: ICD-10-CM
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
## 2021-06-26 PROCEDURE — 6370000000 HC RX 637 (ALT 250 FOR IP): Performed by: EMERGENCY MEDICINE

## 2021-06-26 PROCEDURE — 6360000002 HC RX W HCPCS: Performed by: EMERGENCY MEDICINE

## 2021-06-26 PROCEDURE — 99284 EMERGENCY DEPT VISIT MOD MDM: CPT

## 2021-06-26 PROCEDURE — 73090 X-RAY EXAM OF FOREARM: CPT

## 2021-06-26 PROCEDURE — 90715 TDAP VACCINE 7 YRS/> IM: CPT | Performed by: EMERGENCY MEDICINE

## 2021-06-26 PROCEDURE — 90471 IMMUNIZATION ADMIN: CPT | Performed by: EMERGENCY MEDICINE

## 2021-06-26 RX ORDER — DIAPER,BRIEF,INFANT-TODD,DISP
EACH MISCELLANEOUS ONCE
Status: COMPLETED | OUTPATIENT
Start: 2021-06-27 | End: 2021-06-26

## 2021-06-26 RX ORDER — AMOXICILLIN AND CLAVULANATE POTASSIUM 875; 125 MG/1; MG/1
1 TABLET, FILM COATED ORAL ONCE
Status: COMPLETED | OUTPATIENT
Start: 2021-06-26 | End: 2021-06-26

## 2021-06-26 RX ORDER — AMOXICILLIN AND CLAVULANATE POTASSIUM 875; 125 MG/1; MG/1
1 TABLET, FILM COATED ORAL 2 TIMES DAILY
Qty: 14 TABLET | Refills: 0 | Status: SHIPPED | OUTPATIENT
Start: 2021-06-26 | End: 2021-07-03

## 2021-06-26 RX ADMIN — TETANUS TOXOID, REDUCED DIPHTHERIA TOXOID AND ACELLULAR PERTUSSIS VACCINE, ADSORBED 0.5 ML: 5; 2.5; 8; 8; 2.5 SUSPENSION INTRAMUSCULAR at 22:53

## 2021-06-26 RX ADMIN — BACITRACIN ZINC 1 G: 500 OINTMENT TOPICAL at 23:57

## 2021-06-26 RX ADMIN — AMOXICILLIN AND CLAVULANATE POTASSIUM 1 TABLET: 875; 125 TABLET, FILM COATED ORAL at 22:53

## 2021-06-27 NOTE — ED PROVIDER NOTES
CHIEF COMPLAINT    Chief Complaint   Patient presents with   Adi Laguerre Code is a 59 y.o. female who presents to the ED with complaints of dog bite. Patient states that her dogs at home were in a fight and she went to break up the dogs and unfortunately suffered a dog bite to the left forearm. The dog's rabies vaccinations are up-to-date. Patient has pain in the left forearm describes a throbbing stabbing pain rated as moderate in severity and exacerbated with certain movements and palpation. She is right-hand dominant. She is uncertain of her last tetanus immunization. Pain radiates throughout the left forearm. Denies numbness, tingling, fevers, chills, nausea, vomiting. REVIEW OF SYSTEMS  Constitutional: No fever, chills or recent illness. Eye: No visual changes  HENT: No earache or sore throat. Resp: No SOB or productive cough. Cardio: No chest pain or palpitations. GI: No abdominal pain, nausea, vomiting, constipation or diarrhea. No melena. : No dysuria, urgency or frequency. Endocrine: No heat intolerance, no cold intolerance, no polydipsia   Lymphatics: No adenopathy  Musculoskeletal: Complains of dog bite to left forearm  Neuro: No headaches. Psych: No homicidal or suicidal thoughts  Skin: No rash, No itching. ?  ? 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     Cancer ([address]     back of arm right    Difficult intubation     Graves disease     Hypothyroidism     IUD (intrauterine device) in place 02/25/2008    Mirena    Overactive bladder     Pap smear for cervical cancer screening 06/03/2010    Neg    Pap smear for cervical cancer screening 12/02/2010    Neg    Pap smear for cervical cancer screening 12/06/2011    Neg    Vitamin B 12 deficiency      FAMILY HISTORY  Family History   Problem Relation Age of Onset    Heart Failure Mother     Cancer Maternal Aunt     Stroke Maternal Grandmother SOCIAL HISTORY  Social History     Socioeconomic History    Marital status:      Spouse name: Not on file    Number of children: Not on file    Years of education: Not on file    Highest education level: Not on file   Occupational History    Occupation:    Tobacco Use    Smoking status: Never Smoker    Smokeless tobacco: Never Used   Vaping Use    Vaping Use: Never used   Substance and Sexual Activity    Alcohol use: Yes     Comment: moderate    Drug use: No    Sexual activity: Yes     Partners: Male   Other Topics Concern    Not on file   Social History Narrative    Not on file     Social Determinants of Health     Financial Resource Strain:     Difficulty of Paying Living Expenses:    Food Insecurity:     Worried About Running Out of Food in the Last Year:     [address] in the Last Year:    Transportation Needs:     Lack of Transportation (Medical):  Lack of Transportation (Non-Medical):    Physical Activity:     Days of Exercise per Week:     Minutes of Exercise per Session:    Stress:     Feeling of Stress :    Social Connections:     Frequency of Communication with Friends and Family:     Frequency of Social Gatherings with Friends and Family:     Attends [employer] Services:     Active Member of Clubs or Organizations:     Attends Club or Organization Meetings:     Marital Status:    Intimate Partner Violence:     Fear of Current or Ex-Partner:     Emotionally Abused:     Physically Abused:     Sexually Abused:        SURGICAL HISTORY  Past Surgical History:   Procedure Laterality Date    APPENDECTOMY  9/22/15    laprascopic    COLONOSCOPY  08/2008    Neg    COLONOSCOPY  07/03/2019    polyps divertics 5-10 year repeat    COLONOSCOPY N/A 7/3/2019    COLONOSCOPY POLYPECTOMY SNARE/COLD BIOPSY performed by Fredy Schrader MD at [address]  12/2005    HGSIL    COLPOSCOPY  12/2007    HPV, poss.  mild dysplasia    GYNECOLOGIC CRYOSURGERY  02/09/2006    KNEE ARTHROSCOPY Right 11/2009    Dr. Lottie Velasquez Right     arm    WRIST GANGLION EXCISION Left      CURRENT MEDICATIONS  Discharge Medication List as of 6/26/2021 11:48 PM      CONTINUE these medications which have NOT CHANGED    Details   levothyroxine (SYNTHROID) 112 MCG tablet Take 1 tablet by mouth every morning, Disp-90 tablet, R-3Normal      fluticasone (FLONASE) 50 MCG/ACT nasal spray 2 sprays by Each Nostril route daily, Disp-3 Bottle, R-3Normal      oxybutynin (DITROPAN XL) 15 MG extended release tablet Take 2 tablets by mouth every morning, Disp-180 tablet, R-3Normal      Cyanocobalamin (B-12 PO) Take 1,500 mcg by mouth every morning Historical Med           ALLERGIES  Allergies   Allergen Reactions    Latex Rash     tpae    Pollen Extract     Tape [Adhesive Tape] Rash       Nursing notes reviewed by myself for past medical history, family history, social history, surgical history, current medications, and allergies. PHYSICAL EXAM  VITAL SIGNS: Triage VS:    ED Triage Vitals [06/26/21 2219]   Enc Vitals Group      /85      Pulse 65      Resp 18      Temp 98 °F (36.7 °C)      Temp Source Infrared      SpO2 99 %      Weight 168 lb (76.2 kg)      Height 5' 8\" (1.727 m)      Head Circumference       Peak Flow       Pain Score       Pain Loc       Pain Edu? Excl. in [address]? Constitutional: Well developed, Well nourished, nontoxic appearing  HENT: Normocephalic, Atraumatic, Bilateral external ears normal,Nose normal.   Eyes: PERRL, EOMI, Conjunctiva normal, No discharge. No scleral icterus. Neck: Normal range of motion, No tenderness, Supple. Cardiovascular: Normal heart rate, Normal rhythm, No murmurs, gallops or rubs. Thorax & Lungs: Normal breath sounds, No respiratory distress, No wheezing.   Abdomen: Soft, No tenderness, No masses, No pulsatile masses, No distention, Normal bowel sounds  Skin: Warm, Dry, Pink, No mottling, No erythema, No

## 2021-06-27 NOTE — ED NOTES
Pt presents to ED with c/o a dog bite. Pt states her two dogs were fighting and she was bitten while trying to break it up. Pt states her dogs are up to date on vaccinations.       Zuleyka Munson RN  06/26/21 1869

## 2021-06-28 ENCOUNTER — OFFICE VISIT (OUTPATIENT)
Dept: FAMILY MEDICINE CLINIC | Age: 61
End: 2021-06-28
Payer: COMMERCIAL

## 2021-06-28 VITALS
DIASTOLIC BLOOD PRESSURE: 76 MMHG | HEART RATE: 88 BPM | WEIGHT: 172.2 LBS | BODY MASS INDEX: 26.1 KG/M2 | HEIGHT: 68 IN | OXYGEN SATURATION: 93 % | SYSTOLIC BLOOD PRESSURE: 110 MMHG
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
## 2021-06-28 DIAGNOSIS — S61.552D DOG BITE OF LEFT WRIST, SUBSEQUENT ENCOUNTER: Primary | ICD-10-CM

## 2021-06-28 PROCEDURE — 99214 OFFICE O/P EST MOD 30 MIN: CPT | Performed by: PHYSICIAN ASSISTANT

## 2021-06-28 RX ORDER — DOXYCYCLINE HYCLATE 100 MG
100 TABLET ORAL 2 TIMES DAILY
Qty: 20 TABLET | Refills: 0 | Status: SHIPPED | OUTPATIENT
Start: 2021-06-28 | End: 2021-07-08

## 2021-06-28 NOTE — PROGRESS NOTES
Occupation:    Tobacco Use    Smoking status: Never Smoker    Smokeless tobacco: Never Used   Vaping Use    Vaping Use: Never used   Substance and Sexual Activity    Alcohol use: Yes     Comment: moderate    Drug use: No    Sexual activity: Yes     Partners: Male   Other Topics Concern    Not on file   Social History Narrative    Not on file     Social Determinants of Health     Financial Resource Strain:     Difficulty of Paying Living Expenses:    Food Insecurity:     Worried About Running Out of Food in the Last Year:     [address] in the Last Year:    Transportation Needs:     Lack of Transportation (Medical):  Lack of Transportation (Non-Medical):    Physical Activity:     Days of Exercise per Week:     Minutes of Exercise per Session:    Stress:     Feeling of Stress :    Social Connections:     Frequency of Communication with Friends and Family:     Frequency of Social Gatherings with Friends and Family:     Attends Hindu Services:     Active Member of Clubs or Organizations:     Attends Club or Organization Meetings:     Marital Status:    Intimate Partner Violence:     Fear of Current or Ex-Partner:     Emotionally Abused:     Physically Abused:     Sexually Abused:         SURGICAL HISTORY  Past Surgical History:   Procedure Laterality Date    APPENDECTOMY  9/22/15    laprascopic    COLONOSCOPY  08/2008    Neg    COLONOSCOPY  07/03/2019    polyps divertics 5-10 year repeat    COLONOSCOPY N/A 7/3/2019    COLONOSCOPY POLYPECTOMY SNARE/COLD BIOPSY performed by Caitlin Michel MD at [address]  12/2005    HGSIL    COLPOSCOPY  12/2007    HPV, poss.  mild dysplasia    GYNECOLOGIC CRYOSURGERY  02/09/2006    KNEE ARTHROSCOPY Right 11/2009    Dr. Edna Ramos Right     arm    WRIST GANGLION EXCISION Left        CURRENT MEDICATIONS  Current Outpatient Medications   Medication Sig Dispense Refill    doxycycline hyclate (VIBRA-TABS) 100 MG tablet Take 1 tablet by mouth 2 times daily for 10 days Take with food. 20 tablet 0    amoxicillin-clavulanate (AUGMENTIN) 875-125 MG per tablet Take 1 tablet by mouth 2 times daily for 7 days 14 tablet 0    levothyroxine (SYNTHROID) 112 MCG tablet Take 1 tablet by mouth every morning 90 tablet 3    fluticasone (FLONASE) 50 MCG/ACT nasal spray 2 sprays by Each Nostril route daily 3 Bottle 3    oxybutynin (DITROPAN XL) 15 MG extended release tablet Take 2 tablets by mouth every morning 180 tablet 3    Cyanocobalamin (B-12 PO) Take 1,500 mcg by mouth every morning        No current facility-administered medications for this visit. ALLERGIES  Allergies   Allergen Reactions    Latex Rash     tpae    Pollen Extract     Tape Princess Brass Tape] Rash       RECENT LABS    No results found for: LABA1C  No results found for: EAG    Lab Results   Component Value Date    CHOL 215 (H) 12/02/2020    CHOL 206 (H) 12/06/2019    CHOL 216 (H) 11/20/2018     Lab Results   Component Value Date    LDLCALC 103 (H) 12/06/2019    LDLCALC 106 11/20/2018       Lab Results   Component Value Date    WBC 6.1 09/23/2015    HGB 12.5 09/23/2015    HCT 37.5 09/23/2015    MCV 91.5 09/23/2015     09/23/2015       PHYSICAL EXAM  /76 (Site: Left Upper Arm, Position: Sitting, Cuff Size: Medium Adult)   Pulse 88   Ht 5' 8\" (1.727 m)   Wt 172 lb 3.2 oz (78.1 kg)   SpO2 93%   BMI 26.18 kg/m²     PHYSICAL EXAMINATION:    Constitutional: Appears well-developed and well-nourished. No apparent distress    Mental status: Alert and awake, Oriented to person/place/time, Able to follow commands    Eyes: EOM normal, sclera normal, no visible discharge. HENT: Normocephalic, atraumatic. Mouth/Throat: Mucous membranes are moist. External Ears Normal   Neck: No visualized mass   Pulmonary/Chest: Respiratory effort normal. No visualized signs of difficulty breathing or respiratory distress   Musculoskeletal: Normal gait with no signs of ataxia. Normal range of motion of neck  Neurological:No Facial Asymmetry (Cranial nerve 7 motor function). No gaze palsy. Skin: No significant exanthematous lesions or discoloration noted on facial skin. Linear laceration to left dorsal/ulnar wrist closed with 8 sutures pictured below. Two puncture wounds, one to dorsal hand and other to volar wrist. Left dorsum of hand is erythematous, warm to the touch and edematous. No purulent drainage. Patient unable to make a fist with left hand due to pain. Psychiatric: Normal Affect. No Hallucinations. ASSESSMENT & PLAN  1. Dog bite of left wrist, subsequent encounter  Continue Augmentin and be sure to completely finish this. Added Doxycyline. Recommended taking probiotics with these antibiotics. Referred the patient to hand surgery,  to be seen this week. Discussed this with Dr. Weston Euceda, who also examined the patient, and he agrees with this treatment plan. - Amb External Referral To Orthopedic Surgery  - doxycycline hyclate (VIBRA-TABS) 100 MG tablet; Take 1 tablet by mouth 2 times daily for 10 days Take with food. Dispense: 20 tablet; Refill: 0          Return in about 10 days (around 7/8/2021).             Electronically signed by Manuel Hammonds PA-C on 6/28/2021

## 2022-02-08 ENCOUNTER — HOSPITAL ENCOUNTER (OUTPATIENT)
Dept: PHYSICAL THERAPY | Age: 62
Setting detail: THERAPIES SERIES
Discharge: HOME | End: 2022-02-08
Payer: COMMERCIAL

## 2022-02-08 PROCEDURE — 97161 PT EVAL LOW COMPLEX 20 MIN: CPT

## 2022-02-08 PROCEDURE — 97140 MANUAL THERAPY 1/> REGIONS: CPT

## 2022-02-08 PROCEDURE — 97110 THERAPEUTIC EXERCISES: CPT

## 2022-02-08 ASSESSMENT — PAIN DESCRIPTION - ORIENTATION: ORIENTATION: LEFT

## 2022-02-08 ASSESSMENT — PAIN DESCRIPTION - LOCATION: LOCATION: HIP;KNEE

## 2022-02-08 ASSESSMENT — PAIN - FUNCTIONAL ASSESSMENT: PAIN_FUNCTIONAL_ASSESSMENT: PREVENTS OR INTERFERES WITH ALL ACTIVE AND SOME PASSIVE ACTIVITIES

## 2022-02-08 ASSESSMENT — PAIN DESCRIPTION - ONSET: ONSET: PROGRESSIVE

## 2022-02-08 ASSESSMENT — PAIN DESCRIPTION - FREQUENCY: FREQUENCY: INTERMITTENT

## 2022-02-08 ASSESSMENT — PAIN DESCRIPTION - DESCRIPTORS: DESCRIPTORS: ACHING;DULL

## 2022-02-08 ASSESSMENT — PAIN SCALES - GENERAL: PAINLEVEL_OUTOF10: 2

## 2022-02-08 ASSESSMENT — PAIN DESCRIPTION - PROGRESSION: CLINICAL_PROGRESSION: GRADUALLY IMPROVING

## 2022-02-08 ASSESSMENT — PAIN DESCRIPTION - PAIN TYPE: TYPE: SURGICAL PAIN

## 2022-02-08 NOTE — FLOWSHEET NOTE
Outpatient Physical Therapy  [place]           [x] Phone: [phone]   Fax: [phone]  [facility]           [] Phone: [phone]   Fax: [phone]        Physical Therapy Daily Treatment Note  Date:  2022    Patient Name:  Esther Gongora    :  1960  MRN: [id number]  Restrictions/Precautions: Other position/activity restrictions: anterior hip precautions: no extension past neutral x 6 weeks  Diagnosis:   Diagnosis: L DARNELL  Date of Injury/Surgery: 22  Treatment Diagnosis: Treatment Diagnosis: L hip pain, tissue tightness, stiffness, weakness    Insurance/Certification information: PT Insurance Information: BCBS  $25, 76 PCY   Referring Physician:  Referring Practitioner: Marie Escalona CNP, Surgeon Fantasma Schulte  Next Doctor Visit:    Plan of care signed (Y/N):  pending  Outcome Measure: KOOS 30%  Visit# / total visits:   - POC and script   Pain level: 2/10   Goals:     Patient goals : improve sleep, resume running for dog training, improve confidence in hip/LE  Short term goals  Time Frame for Short term goals: 3 weeks  Short term goal 1: Pt will be able to achieve neutral hip extension w/o pain or limit  Short term goal 2: Pt will be able to peform hip flex/ext ROM w/o snapping in groin  Long term goals  Time Frame for Long term goals : 6 weeks  Long term goal 1: Pt will be independent w/ HEP and able to continue to progress on her own  Long term goal 2: Pt will be able to begin progression back to jogging for dog training  Long term goal 3: Pt will be able to perform her usual activity w/o anterior hip pain or snapping  Long term goal 4: Pt will have improved HOOS by 10% or more    Summary of Evaluation: Assessment: Pt is a 58 yo female who presents 4 weeks postop L anterior DARNELL. She has Hx B knee AKS and some knee pain still on L as well as continued c/o groin pain and snapping in groin/anterior hip.   She demonstrates limited hip ext, tightness in anterior hip, weakness of gluts, core and hip w/ some pain and snapping too. These limitations are affecting her work as a  and usual activity. She will benefit from PT to help restore PLOF. 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 Subjective:  See ashwini         Any changes in Ambulatory Summary Sheet? None        Objective:  See ashwini   COVID screening questions were asked and patient attested that there had been no contact or symptoms      No hip ext past neutral or bridge for 6 weeks: 2/22/22  Exercises: (No more than 4 columns)   Exercise/Equipment 2/8/22   #1 Date Date           WARM UP       Nustep  --           TABLE      Manual work  See below     H/L abd/ER AAROM 10x      Prone lying  instruct      KTC  Towel 10x  Ball 10x      Clam  10x      Hip abd and add iso  Next                           STANDING      STS  20.5\" 10x                                               PROPRIOCEPTION                                    MODALITIES      CP  declined               Other Therapeutic Activities/Education:  2/8/2022: Patient received education on their current pathology and how their condition effects them with their functional activities. Patient understood discussion and questions were answered. Patient understands their activity limitations and understands rational for treatment progression. Home Exercise Program:    Access Code: [email or web address]  URL: [email or web address]  Date: 02/08/2022  Prepared by: Bill Salcedo    Exercises  Lying Prone - 1-2 x daily - 7 x weekly - 1-2 sets - 1-5 reps - minutes hold  Bent Knee Fallouts - 1-2 x daily - 7 x weekly - 1-2 sets - 10 reps  Hook Lying Single Knee to Chest Stretch with Towel - 1-2 x daily - 7 x weekly - 1-2 sets - 10 reps - 5-10 seconds hold  Clamshell - 1-2 x daily - 7 x weekly - 1-2 sets - 10 reps  Sit to Stand - 1-2 x daily - 7 x weekly - 1-2 sets - 10 reps      Manual Treatments:  Psoas release w/ STM/MFR/CFM and some scar massage Modalities:        Communication with other providers:  POC faxed 2/8/22      Assessment:  Pt is a 60 yo female who presents 4 weeks postop L anterior DARNELL. She has Hx B knee AKS and some knee pain still on L as well as continued c/o groin pain and snapping in groin/anterior hip. She demonstrates limited hip ext, tightness in anterior hip, weakness of gluts, core and hip w/ some pain and snapping too. These limitations are affecting her work as a  and usual activity. She will benefit from PT to help restore PLOF. 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 Plan for Next Session:  continue manual work to psoas and begin on adductor prn as well, work into neutral hip ext for now and progress to more extension at the 6 week ivis. No bridges until 6 weeks please.   Advance hip strength to grace adding core work and eventually more functional movements to prepare for jogging w/ dog training      Time In / Time Out:   1415/1500       Timed Code/Total Treatment Minutes:  25/45  1 Man 10', 1 TE 15', 1eval 20'      Next Progress Note due:  30 days       Plan of Care Interventions:  [x] Therapeutic Exercise  [x] Modalities:  [x] Therapeutic Activity     [] Ultrasound  [] Estim  [x] Gait Training      [] Cervical Traction [] Lumbar Traction  [x] Neuromuscular Re-education    [x] Cold/hotpack [] Iontophoresis   [x] Instruction in HEP      [] Vasopneumatic   [] Dry Needling    [x] Manual Therapy               [] Aquatic Therapy              Electronically signed by:  Geovanna Rios PT,  PT, MPT, ATC  2/8/2022, 4:22 PM    2/8/2022, 4:30 PM

## 2022-02-08 NOTE — PROGRESS NOTES
Physical Therapy  Initial Assessment  Date: 2022  Patient Name: Rashel See  MRN: [id number]  : 1960     Treatment Diagnosis: L hip pain, tissue tightness, stiffness, weakness    Restrictions  Position Activity Restriction  Other position/activity restrictions: anterior hip precautions: no extension past neutral x 6 weeks    Subjective   General  Chart Reviewed: Yes  Patient assessed for rehabilitation services?: Yes  Additional Pertinent Hx: R knee AKS , L knee also in   Referring Practitioner: Kaylee Grace  Diagnosis: L DARNELL  General Comment  Comments: anteior approach, no hip ext for at least 6 weeks,   HEP QS, glut set, heel slide, SLR adn hip abd, ankle pumps and calf stretch  PT Visit Information  PT Insurance Information: BCBS  $25, 76 PCY  Subjective  Subjective: DOS 22, HEP gone well but no homecare PT. Pain manageable. She is still having some deep groin pain though, which she was having before surgery also, would get deep tissue and chiro and would help a while but come back, she thinks psoas is tight, and maybe b/c of hip.  she does dog training and hasn't done this yet to see how it goes, but feeling like this would still increase her pain based on symptoms she's having, she can't lay flat in bed yet, Walking in snow did increase pain some too.  sleep is 4-5 horus at a time. sleeping in chair some, bed some, has to move around. REst helps, min ice now.   Some L knee paion too  Pain Screening  Patient Currently in Pain: Yes  Pain Assessment  Pain Assessment: 0-10  Pain Level: 2  Patient's Stated Pain Goal: No pain  Pain Type: Surgical pain  Pain Location: Hip;Knee  Pain Orientation: Left  Pain Descriptors: Aching;Dull  Pain Frequency: Intermittent  Pain Onset: Progressive  Clinical Progression: Gradually improving  Functional Pain Assessment: Prevents or interferes with all active and some passive activities  Vital Signs  Patient Currently in Pain: Yes    Vision/Hearing Orientation  Orientation  Overall Orientation Status: Within Normal Limits    Social/Functional History  Social/Functional History  Lives With: Spouse  Type of Home: House  Home Layout: Two level (was going up/down normally at 2 weeks)  Bathroom Shower/Tub: Tub/Shower unit  Home Equipment: Cane;Rolling walker (has been w/o cane 2 weeks)  ADL Assistance: Independent  Homemaking Assistance: Independent  Ambulation Assistance: Independent  Transfer Assistance: Independent  Active : Yes  Mode of Transportation: Car;SUV  Occupation: Self employed  Type of occupation: , 20 steve hours/week  Leisure & Hobbies: tennis, dock diving w/ dog, kayaking    Objective     Observation/Palpation  Palpation: min TTP noted at hip, but is TTP w/ tightness in psoas and some in glut too on R, mild adhesions along scar  Observation: mostly normal gait w/ some decreased hip ext noted. AROM RLE (degrees)  RLE General AROM: seated hip IR 28, ER 25,  AROM LLE (degrees)  LLE General AROM: seated hip IR 22, ER 20, supine ext lacking 8 from 0, flex 103 w/ mild discomfort end ranges flex and ext    Strength RLE  Strength RLE: WNL  Strength LLE  Comment: ankle and knee WNL, hip flex and abd functional but not tested d/t postop     Additional Measures  Special Tests: NT d/t postop     Assessment   Conditions Requiring Skilled Therapeutic Intervention  Body structures, Functions, Activity limitations: Decreased functional mobility ; Decreased ADL status; Decreased ROM; Decreased strength;Decreased high-level IADLs; Increased pain  Assessment: Pt is a 58 yo female who presents 4 weeks postop L anterior DARNELL. She has Hx B knee AKS and some knee pain still on L as well as continued c/o groin pain and snapping in groin/anterior hip. She demonstrates limited hip ext, tightness in anterior hip, weakness of gluts, core and hip w/ some pain and snapping too. These limitations are affecting her work as a  and usual activity. She will benefit from PT to help restore PLOF. 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 Patient agrees with established plan of care and assisted in the development of their short term and long term goals. Patient had no adverse reaction with initial treatment and there are no barriers to learning. Learning preferences include demonstration, practice, and handouts. Patient expressed understanding of HEP and appears to be motivated to participate in an active PT program including compliance with HEP expectations. Treatment Diagnosis: L hip pain, tissue tightness, stiffness, weakness  Prognosis: Good  Decision Making: Low Complexity  Barriers to Learning: none  REQUIRES PT FOLLOW UP: Yes  Treatment Initiated : see flow sheet         Plan   Plan  Times per week: 2x  Plan weeks: 6weeks  Specific instructions for Next Treatment: continue manual work to psoas and begin on adductor prn as well, work into neutral hip ext for now and progress to more extension at the 6 week ivis. No bridges until 6 weeks please.   Advance hip strength to grace adding core work and eventually more functional movements to prepare for jogging w/ dog training  Current Treatment Recommendations: Strengthening,ROM,Functional Mobility Training,Modalities,Pain Management,Gait Training,Manual Therapy - Joint Manipulation,Manual Therapy - Soft Tissue Mobilization,Neuromuscular Re-education,Home Exercise Program,Patient/Caregiver Education & Training       OutComes Score     KOOS 30%     Goals  Short term goals  Time Frame for Short term goals: 3 weeks  Short term goal 1: Pt will be able to achieve neutral hip extension w/o pain or limit  Short term goal 2: Pt will be able to peform hip flex/ext ROM w/o snapping in groin  Long term goals  Time Frame for Long term goals : 6 weeks  Long term goal 1: Pt will be independent w/ HEP and able to continue to progress on her own  Long term goal 2: Pt will be able to begin progression back to jogging for dog training  Long term goal 3: Pt will be able to perform her usual activity w/o anterior hip pain or snapping  Long term goal 4: Pt will have improved HOOS by 10% or more  Patient Goals   Patient goals : improve sleep, resume running for dog training, improve confidence in hip/LE       Mantachie Hunting, PT  PT, MPT, ATC     2/8/2022, 4:22 PM

## 2022-02-08 NOTE — PLAN OF CARE
Outpatient Physical Therapy           [place]           [x] Phone: [phone]   Fax: [phone]  [facility]           [] Phone: [phone]   Fax: [phone]     To: Referring Practitioner: Jolynn Castle    From: Sandra García, PT, PT     Patient: Fleet Opitz        : 1960  Diagnosis: Diagnosis: L DARNELL   Treatment Diagnosis: Treatment Diagnosis: L hip pain, tissue tightness, stiffness, weakness   Date: 2022    Physical Therapy Certification/Re-Certification Form  Dear Dr. Roney Bundy,  The following patient has been evaluated for physical therapy services and for therapy to continue, insurance requires physician review of the treatment plan initially and every 90 days. Please review the attached evaluation and/or summary of the patient's plan of care, and verify that you agree therapy should continue by signing the attached document and sending it back to our office. Assessment:    Assessment: Pt is a 60 yo female who presents 4 weeks postop L anterior DARNELL. She has Hx B knee AKS and some knee pain still on L as well as continued c/o groin pain and snapping in groin/anterior hip. She demonstrates limited hip ext, tightness in anterior hip, weakness of gluts, core and hip w/ some pain and snapping too. These limitations are affecting her work as a  and usual activity. She will benefit from PT to help restore PLOF. 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 Patient agrees with established plan of care and assisted in the development of their short term and long term goals. Patient had no adverse reaction with initial treatment and there are no barriers to learning. Learning preferences include demonstration, practice, and handouts. Patient expressed understanding of HEP and appears to be motivated to participate in an active PT program including compliance with HEP expectations.        Plan of Care/Treatment to date:  [x] Therapeutic Exercise  [x] Modalities:  [x] Therapeutic Activity     [] Ultrasound  [] Electrical Stimulation  [x] Gait Training      [] Cervical Traction [] Lumbar Traction  [x] Neuromuscular Re-education    [x] Cold/hotpack [] Iontophoresis   [x] Instruction in HEP      [] Vasopneumatic    [] Dry Needling  [x] Manual Therapy               [] Aquatic Therapy       Other:          Frequency/Duration:  # Days per week: [] 1 day # Weeks: [] 1 week [] 5 weeks     [x] 2 days   [] 2 weeks [x] 6 weeks     [] 3 days   [] 3 weeks [] 7 weeks     [] 4 days   [] 4 weeks [x] 8 weeks         [] 9 weeks [] 10 weeks         [] 11 weeks [] 12 weeks    Rehab Potential/Progress: [] Excellent [x] Good [] Fair  [] Poor     Goals:    Patient goals : improve sleep, resume running for dog training, improve confidence in hip/LE  Short term goals  Time Frame for Short term goals: 3 weeks  Short term goal 1: Pt will be able to achieve neutral hip extension w/o pain or limit  Short term goal 2: Pt will be able to peform hip flex/ext ROM w/o snapping in groin  Long term goals  Time Frame for Long term goals : 6 weeks  Long term goal 1: Pt will be independent w/ HEP and able to continue to progress on her own  Long term goal 2: Pt will be able to begin progression back to jogging for dog training  Long term goal 3: Pt will be able to perform her usual activity w/o anterior hip pain or snapping  Long term goal 4: Pt will have improved HOOS by 10% or more      Electronically signed by:  Emilee Villarreal PT, PT, MPT, ATC  2/8/2022, 4:31 PM    2/8/2022, 4:31 PM  If you have any questions or concerns, please don't hesitate to call.   Thank you for your referral.      Physician Signature:________________________________Date:_________ TIME: _____  By signing above, therapists plan is approved by physician

## 2022-02-11 ENCOUNTER — HOSPITAL ENCOUNTER (OUTPATIENT)
Dept: PHYSICAL THERAPY | Age: 62
Setting detail: THERAPIES SERIES
Discharge: HOME | End: 2022-02-11
Payer: COMMERCIAL

## 2022-02-11 PROCEDURE — 97140 MANUAL THERAPY 1/> REGIONS: CPT

## 2022-02-11 PROCEDURE — 97110 THERAPEUTIC EXERCISES: CPT

## 2022-02-11 NOTE — FLOWSHEET NOTE
Outpatient Physical Therapy  [place]           [x] Phone: [phone]   Fax: [phone]  [facility]           [] Phone: [phone]   Fax: [phone]        Physical Therapy Daily Treatment Note  Date:  2022    Patient Name:  Brooklynn Guevara    :  1960  MRN: [id number]  Restrictions/Precautions: Other position/activity restrictions: anterior hip precautions: no extension past neutral x 6 weeks  Diagnosis:   Diagnosis: L DARNELL  Date of Injury/Surgery: 22  Treatment Diagnosis: Treatment Diagnosis: L hip pain, tissue tightness, stiffness, weakness    Insurance/Certification information: PT Insurance Information: BCBS  $25, 76 PCY   Referring Physician:  Referring Practitioner: Bruce Christopher CNP, Surgeon Evita Gilbert  Next Doctor Visit:  6 weeks post OP  Plan of care signed (Y/N):  pending   Outcome Measure: KOOS 30%  Visit# / total visits:   -16 POC and script   Pain level: 0/10   Goals:     Patient goals : improve sleep, resume running for dog training, improve confidence in hip/LE  Short term goals  Time Frame for Short term goals: 3 weeks  Short term goal 1: Pt will be able to achieve neutral hip extension w/o pain or limit  Short term goal 2: Pt will be able to peform hip flex/ext ROM w/o snapping in groin  Long term goals  Time Frame for Long term goals : 6 weeks  Long term goal 1: Pt will be independent w/ HEP and able to continue to progress on her own  Long term goal 2: Pt will be able to begin progression back to jogging for dog training  Long term goal 3: Pt will be able to perform her usual activity w/o anterior hip pain or snapping  Long term goal 4: Pt will have improved HOOS by 10% or more    Summary of Evaluation: Assessment: Pt is a 58 yo female who presents 4 weeks postop L anterior DARNELL. She has Hx B knee AKS and some knee pain still on L as well as continued c/o groin pain and snapping in groin/anterior hip.   She demonstrates limited hip ext, tightness in anterior hip, weakness of gluts, core and hip w/ some pain and snapping too. These limitations are affecting her work as a  and usual activity. She will benefit from PT to help restore PLOF. 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 Subjective:  Pt stated she has been doing her HEP and is trying to take it easy which is hard for her. Any changes in Ambulatory Summary Sheet? None        Objective:  See eval   COVID screening questions were asked and patient attested that there had been no contact or symptoms  No pain  Report c/o snapping with return to ext with [facility] w/ ball  Cued for relaxation during PROM/AAROM    No hip ext past neutral or bridge for 6 weeks: 2/22/22  Exercises: (No more than 4 columns)   Exercise/Equipment 2/8/22   #1 2/11/22 #2 Date           WARM UP       Nustep  -- 5'          TABLE      Manual work  See below     H/L abd/ER AAROM 10x      Prone lying  instruct      KTC  Towel 10x  Ball 10x  Ball 2 x 10    Clam  10x  10x    Hip abd and add iso  Next  10x 5 ct ea. STANDING      STS  20.5\" 10x  20.5\" 2 x 10                                              PROPRIOCEPTION                                    MODALITIES      CP  declined               Other Therapeutic Activities/Education:  2/8/2022: Patient received education on their current pathology and how their condition effects them with their functional activities. Patient understood discussion and questions were answered. Patient understands their activity limitations and understands rational for treatment progression. Home Exercise Program:    Access Code: [email or web address]  URL: [email or web address]  Date: 02/08/2022  Prepared by: Bill Salcedo    Exercises  Lying Prone - 1-2 x daily - 7 x weekly - 1-2 sets - 1-5 reps - minutes hold  Bent Knee Fallouts - 1-2 x daily - 7 x weekly - 1-2 sets - 10 reps  Hook Lying Single Knee to Chest Stretch with Towel - 1-2 x daily - 7 x weekly - 1-2 sets - 10 reps - 5-10 seconds hold  Clamshell - 1-2 x daily - 7 x weekly - 1-2 sets - 10 reps  Sit to Stand - 1-2 x daily - 7 x weekly - 1-2 sets - 10 reps      Manual Treatments:  AAROM/PROM hip, Psoas and proximal adductor STM release w/ STM/MFR/CFM and some scar massage       Modalities:  x      Communication with other providers:  POC faxed 2/8/22      Assessment:    Pt demonstrated GOOD tolerance to tx with added exercises and MT. No pain reported. Some clicking with eccentric hip flexion end range. Compliant with HEP. Pt would continue to benefit from skilled therapy interventions to address remaining impairments, improve mobility and strength and progress toward goal completion while reducing risk for re-injury or further decline. 0/10 pain looser with ambulation    Pt is a 60 yo female who presents 4 weeks postop L anterior DARNELL. She has Hx B knee AKS and some knee pain still on L as well as continued c/o groin pain and snapping in groin/anterior hip. She demonstrates limited hip ext, tightness in anterior hip, weakness of gluts, core and hip w/ some pain and snapping too. These limitations are affecting her work as a  and usual activity. She will benefit from PT to help restore PLOF. 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 Plan for Next Session:  continue manual work to psoas and begin on adductor prn as well, work into neutral hip ext for now and progress to more extension at the 6 week ivis. No bridges until 6 weeks please.   Advance hip strength to grace adding core work and eventually more functional movements to prepare for jogging w/ dog training      Time In / Time Out:   0945/1030       Timed Code/Total Treatment Minutes:  45/45  1 Man 15', 1 TE 30'      Next Progress Note due:  30 days       Plan of Care Interventions:  [x] Therapeutic Exercise  [x] Modalities:  [x] Therapeutic Activity     [] Ultrasound  [] Estim  [x] Gait Training      [] Cervical Traction [] Lumbar Traction  [x] Neuromuscular Re-education    [x] Cold/hotpack [] Iontophoresis   [x] Instruction in HEP      [] Vasopneumatic   [] Dry Needling    [x] Manual Therapy               [] Aquatic Therapy              Electronically signed by:  Priscilla Dumont PTA, CLT  2/11/2022, 9:45 AM

## 2022-02-14 ENCOUNTER — HOSPITAL ENCOUNTER (OUTPATIENT)
Dept: PHYSICAL THERAPY | Age: 62
Setting detail: THERAPIES SERIES
Discharge: HOME | End: 2022-02-14
Payer: COMMERCIAL

## 2022-02-14 PROCEDURE — 97140 MANUAL THERAPY 1/> REGIONS: CPT

## 2022-02-14 PROCEDURE — 97110 THERAPEUTIC EXERCISES: CPT

## 2022-02-14 NOTE — FLOWSHEET NOTE
Outpatient Physical Therapy  [place]           [x] Phone: [phone]   Fax: [phone]  [facility]urn[facility] Smith           [] Phone: [phone]   Fax: [phone]        Physical Therapy Daily Treatment Note  Date:  2022    Patient Name:  Shameka Gabriel    :  1960  MRN: [id number]  Restrictions/Precautions: Other position/activity restrictions: anterior hip precautions: no extension past neutral x 6 weeks  Diagnosis:   Diagnosis: L DARNELL  Date of Injury/Surgery: 22  Treatment Diagnosis: Treatment Diagnosis: L hip pain, tissue tightness, stiffness, weakness    Insurance/Certification information: PT Insurance Information: BCBS  $25, 76 PCY   Referring Physician:  Referring Practitioner: Afshan Davidson CNP, Surgeon Ina Ganser  Next Doctor Visit:  6 weeks post OP  Plan of care signed (Y/N):  pending   Outcome Measure: KOOS 30%  Visit# / total visits:   3/12- POC and script   Pain level: 2/10   Goals:     Patient goals : improve sleep, resume running for dog training, improve confidence in hip/LE  Short term goals  Time Frame for Short term goals: 3 weeks  Short term goal 1: Pt will be able to achieve neutral hip extension w/o pain or limit  Short term goal 2: Pt will be able to peform hip flex/ext ROM w/o snapping in groin  Long term goals  Time Frame for Long term goals : 6 weeks  Long term goal 1: Pt will be independent w/ HEP and able to continue to progress on her own  Long term goal 2: Pt will be able to begin progression back to jogging for dog training  Long term goal 3: Pt will be able to perform her usual activity w/o anterior hip pain or snapping  Long term goal 4: Pt will have improved HOOS by 10% or more    Summary of Evaluation: Assessment: Pt is a 58 yo female who presents 4 weeks postop L anterior DARNELL. She has Hx B knee AKS and some knee pain still on L as well as continued c/o groin pain and snapping in groin/anterior hip.   She demonstrates limited hip ext, tightness in anterior hip, weakness of gluts, core and hip w/ some pain and snapping too. These limitations are affecting her work as a  and usual activity. She will benefit from PT to help restore PLOF. 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 Subjective:  Pt arrives to tx session reporting 2/10 pain in L hip. States that she went up and down her basement stairs 6-8 times yesterday. Any changes in Ambulatory Summary Sheet? None        Objective:  See eval   COVID screening questions were asked and patient attested that there had been no contact or symptoms    Multiple muscle knots palpated in proximal Psoas and adductor; responds well to manual   Demo good technique with exercises. No hip ext past neutral or bridge for 6 weeks: 2/22/22  Exercises: (No more than 4 columns)   Exercise/Equipment 2/8/22   #1 2/11/22 #2 2/14/22 #3           WARM UP       Nustep  -- 5' 5'          TABLE      Manual work  See below  See below   H/L abd/ER AAROM 10x      Prone lying  instruct      KTC  Towel 10x  Ball 10x  Ball 2 x 10 Ball 2 x 10   Clam  10x  10x 10x2   Hip abd and add iso  Next  10x 5 ct ea. 10x 5 ct ea. STANDING      STS  20.5\" 10x  20.5\" 2 x 10  20.5\" x20                                             PROPRIOCEPTION                                    MODALITIES      CP  declined               Other Therapeutic Activities/Education:  2/8/2022: Patient received education on their current pathology and how their condition effects them with their functional activities. Patient understood discussion and questions were answered. Patient understands their activity limitations and understands rational for treatment progression. Home Exercise Program:    Access Code: [email or web address]  URL: [email or web address]  Date: 02/08/2022  Prepared by: Jojo Marcelo    Exercises  Lying Prone - 1-2 x daily - 7 x weekly - 1-2 sets - 1-5 reps - minutes hold  Bent Knee Fallouts - 1-2 x daily - 7 x weekly - 1-2 sets - 10 reps  Hook Lying Single Knee to Chest Stretch with Towel - 1-2 x daily - 7 x weekly - 1-2 sets - 10 reps - 5-10 seconds hold  Clamshell - 1-2 x daily - 7 x weekly - 1-2 sets - 10 reps  Sit to Stand - 1-2 x daily - 7 x weekly - 1-2 sets - 10 reps      Manual Treatments:  AAROM/PROM hip, Psoas and proximal adductor STM release w/ STM/MFR/CFM and some scar massage       Modalities:  x      Communication with other providers:  POC faxed 2/8/22      Assessment:  Pt tolerates tx session well without adverse reactions or complications. Responds well to manual with decreased pain and better amb. Pt would continue to benefit from skilled therapy interventions to address remaining impairments, improve mobility and strength and progress toward goal completion while reducing risk for re-injury or further decline. 1/10 pain after tx      Pt is a 58 yo female who presents 4 weeks postop L anterior DARNELL. She has Hx B knee AKS and some knee pain still on L as well as continued c/o groin pain and snapping in groin/anterior hip. She demonstrates limited hip ext, tightness in anterior hip, weakness of gluts, core and hip w/ some pain and snapping too. These limitations are affecting her work as a  and usual activity. She will benefit from PT to help restore PLOF. 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 Plan for Next Session:  continue manual work to psoas and begin on adductor prn as well, work into neutral hip ext for now and progress to more extension at the 6 week ivis. No bridges until 6 weeks please.   Advance hip strength to grace adding core work and eventually more functional movements to prepare for jogging w/ dog training      Time In / Time Out:   0917/1030       Timed Code/Total Treatment Minutes:   40'/40'    1 Man    2 TE       Next Progress Note due:  30 days       Plan of Care Interventions:  [x] Therapeutic Exercise  [x] Modalities:  [x] Therapeutic Activity     [] Ultrasound  [] Estim  [x] Gait Training      [] Cervical Traction [] Lumbar Traction  [x] Neuromuscular Re-education    [x] Cold/hotpack [] Iontophoresis   [x] Instruction in HEP      [] Vasopneumatic   [] Dry Needling    [x] Manual Therapy               [] Aquatic Therapy              Electronically signed by:  Boo Farley PTA,   2/14/2022, 9:01 AM

## 2022-02-17 ENCOUNTER — HOSPITAL ENCOUNTER (OUTPATIENT)
Dept: PHYSICAL THERAPY | Age: 62
Setting detail: THERAPIES SERIES
Discharge: HOME | End: 2022-02-17
Payer: COMMERCIAL

## 2022-02-17 PROCEDURE — 97110 THERAPEUTIC EXERCISES: CPT

## 2022-02-17 PROCEDURE — 97140 MANUAL THERAPY 1/> REGIONS: CPT

## 2022-02-17 NOTE — FLOWSHEET NOTE
Outpatient Physical Therapy  [place]           [x] Phone: [phone]   Fax: [phone]  [facility]           [] Phone: [phone]   Fax: [phone]        Physical Therapy Daily Treatment Note  Date:  2022    Patient Name:  Rashel See    :  1960  MRN: [id number]  Restrictions/Precautions: Other position/activity restrictions: anterior hip precautions: no extension past neutral x 6 weeks  Diagnosis:   Diagnosis: L DARNELL  Date of Injury/Surgery: 22  Treatment Diagnosis: Treatment Diagnosis: L hip pain, tissue tightness, stiffness, weakness    Insurance/Certification information: PT Insurance Information: BCBS  $25, 76 PCY   Referring Physician:  Referring Practitioner: Kaylee Grace CNP, Surgeon Veronica Goldstein  Next Doctor Visit:  6 weeks post OP  Plan of care signed (Y/N):  pending   Outcome Measure: KOOS 30%  Visit# / total visits:   -16 POC and script   Pain level: 0/10   Goals:     Patient goals : improve sleep, resume running for dog training, improve confidence in hip/LE  Short term goals  Time Frame for Short term goals: 3 weeks  Short term goal 1: Pt will be able to achieve neutral hip extension w/o pain or limit  Short term goal 2: Pt will be able to peform hip flex/ext ROM w/o snapping in groin  Long term goals  Time Frame for Long term goals : 6 weeks  Long term goal 1: Pt will be independent w/ HEP and able to continue to progress on her own  Long term goal 2: Pt will be able to begin progression back to jogging for dog training  Long term goal 3: Pt will be able to perform her usual activity w/o anterior hip pain or snapping  Long term goal 4: Pt will have improved HOOS by 10% or more    Summary of Evaluation: Assessment: Pt is a 60 yo female who presents 4 weeks postop L anterior DARNELL. She has Hx B knee AKS and some knee pain still on L as well as continued c/o groin pain and snapping in groin/anterior hip.   She demonstrates limited hip ext, tightness in anterior hip, weakness of gluts, core and hip w/ some pain and snapping too. These limitations are affecting her work as a  and usual activity. She will benefit from PT to help restore PLOF. 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 Subjective:  Pt stated she is feeling strong. Still not able to sleep in her bed. Her hip is too tight. Patient returning to work on Monday. Any changes in Ambulatory Summary Sheet? None        Objective:   COVID screening questions were asked and patient attested that there had been no contact or symptoms  Difficulty with supine lying. Improved post tx    Demo good technique with exercises. No hip ext past neutral or bridge for 6 weeks: 2/22/22  Exercises: (No more than 4 columns)   Exercise/Equipment 2/8/22   #1 2/11/22 #2 2/14/22 #3 2/17/22            WARM UP        Nustep  -- 5' 5'  5 lv 6          TABLE       Manual work  See below  See below See below   H/L abd/ER AAROM 10x       Prone lying  instruct       KTC  Towel 10x  Ball 10x  Ball 2 x 10 Ball 2 x 10 Ball 2 x 10   Clam  10x  10x 10x2 X 10 YTB   Hip abd and add iso  Next  10x 5 ct ea. 10x 5 ct ea. -                           STANDING       STS  20.5\" 10x  20.5\" 2 x 10  20.5\" x20  20\" x20          3-way steam boat    YTBx 10 flex/ abd  Ext to neutral no TB   Mini squats       shuttle      2 cords B LE  X 10  1 cord L LE  X 10   Side stepping w TB     30 ft ea. Dir YTB               PROPRIOCEPTION       SLS balance    30 s; 15 x 2   Tandem    30 s ea. SLS airex    30 s x 2   airex mini squats     X 10 cued for proper technique          MODALITIES       CP  declined                 Other Therapeutic Activities/Education:  2/8/2022: Patient received education on their current pathology and how their condition effects them with their functional activities. Patient understood discussion and questions were answered.  Patient understands their activity limitations and understands rational for treatment progression. Home Exercise Program:    Access Code: [email or web address]  URL: [email or web address]  Date: 02/08/2022  Prepared by: Daryl Bhatti    Exercises  Lying Prone - 1-2 x daily - 7 x weekly - 1-2 sets - 1-5 reps - minutes hold  Bent Knee Fallouts - 1-2 x daily - 7 x weekly - 1-2 sets - 10 reps  Hook Lying Single Knee to Chest Stretch with Towel - 1-2 x daily - 7 x weekly - 1-2 sets - 10 reps - 5-10 seconds hold  Clamshell - 1-2 x daily - 7 x weekly - 1-2 sets - 10 reps  Sit to Stand - 1-2 x daily - 7 x weekly - 1-2 sets - 10 reps    2/17/22 updated HEP TB for clams, side stepping w TB, standing SLR/ abd with TB, mini squats, SLS. HO given    Manual Treatments:  AAROM/PROM hip, Psoas and proximal adductor STM release w/ STM/MFR/CFM       Modalities:  x      Communication with other providers:  POC faxed 2/8/22      Assessment:  Pt demonstrated GOOD tolerance to tx session without adverse reactions or complications with increase flexibility in the groin and no pain without added exercises. Updated HEP. Pt would continue to benefit from skilled therapy interventions to address remaining impairments, improve mobility and strength and progress toward goal completion while reducing risk for re-injury or further decline. 0/10 pain after tx looser      Pt is a 58 yo female who presents 4 weeks postop L anterior DARNELL. She has Hx B knee AKS and some knee pain still on L as well as continued c/o groin pain and snapping in groin/anterior hip. She demonstrates limited hip ext, tightness in anterior hip, weakness of gluts, core and hip w/ some pain and snapping too. These limitations are affecting her work as a  and usual activity. She will benefit from PT to help restore PLOF. 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       Plan for Next Session:  continue manual work to psoas and begin on adductor prn as well, work into neutral hip ext for now and progress to more extension at the 6 week ivis. No bridges until 6 weeks please.   Advance hip strength to grace adding core work and eventually more functional movements to prepare for jogging w/ dog training      Time In / Time Out:   1350/1435       Timed Code/Total Treatment Minutes:   45'/45'    1 Man    2 TE       Next Progress Note due:  30 days       Plan of Care Interventions:  [x] Therapeutic Exercise  [x] Modalities:  [x] Therapeutic Activity     [] Ultrasound  [] Estim  [x] Gait Training      [] Cervical Traction [] Lumbar Traction  [x] Neuromuscular Re-education    [x] Cold/hotpack [] Iontophoresis   [x] Instruction in HEP      [] Vasopneumatic   [] Dry Needling    [x] Manual Therapy               [] Aquatic Therapy              Electronically signed by:  Tee Layton PTA, CLT  2/17/2022, 1:53 PM

## 2022-02-21 ENCOUNTER — HOSPITAL ENCOUNTER (OUTPATIENT)
Dept: PHYSICAL THERAPY | Age: 62
Setting detail: THERAPIES SERIES
Discharge: HOME | End: 2022-02-21
Payer: COMMERCIAL

## 2022-02-21 PROCEDURE — 97140 MANUAL THERAPY 1/> REGIONS: CPT

## 2022-02-21 PROCEDURE — 97110 THERAPEUTIC EXERCISES: CPT

## 2022-02-21 NOTE — FLOWSHEET NOTE
Outpatient Physical Therapy  [place]           [x] Phone: [phone]   Fax: [phone]  [facility]           [] Phone: [phone]   Fax: [phone]        Physical Therapy Daily Treatment Note  Date:  2022    Patient Name:  Kelly Mayer    :  1960  MRN: [id number]  Restrictions/Precautions: Other position/activity restrictions: anterior hip precautions: no extension past neutral x 6 weeks  Diagnosis:   Diagnosis: L DARNELL  Date of Injury/Surgery: 22  Treatment Diagnosis: Treatment Diagnosis: L hip pain, tissue tightness, stiffness, weakness    Insurance/Certification information: PT Insurance Information: BCBS  $25, 76 PCY   Referring Physician:  Referring Practitioner: Alida Ortega CNP, Surgeon James Michel  Next Doctor Visit:  6 weeks post OP  Plan of care signed (Y/N):  Pending, re-faxed 22  Outcome Measure: KOOS 30%  Visit# / total visits:   -16 POC and script   Pain level: 0/10   Goals:     Patient goals : improve sleep, resume running for dog training, improve confidence in hip/LE  Short term goals  Time Frame for Short term goals: 3 weeks  Short term goal 1: Pt will be able to achieve neutral hip extension w/o pain or limit  Short term goal 2: Pt will be able to peform hip flex/ext ROM w/o snapping in groin  Long term goals  Time Frame for Long term goals : 6 weeks  Long term goal 1: Pt will be independent w/ HEP and able to continue to progress on her own  Long term goal 2: Pt will be able to begin progression back to jogging for dog training  Long term goal 3: Pt will be able to perform her usual activity w/o anterior hip pain or snapping  Long term goal 4: Pt will have improved HOOS by 10% or more    Summary of Evaluation: Assessment: Pt is a 58 yo female who presents 4 weeks postop L anterior DARNELL. She has Hx B knee AKS and some knee pain still on L as well as continued c/o groin pain and snapping in groin/anterior hip.   She demonstrates limited hip ext, tightness in anterior hip, weakness of gluts, core and hip w/ some pain and snapping too. These limitations are affecting her work as a  and usual activity. She will benefit from PT to help restore PLOF. 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 Subjective:    Pt reports sore from some increased activity. Max pain since here last 2/10. Still occasional snapping in groin w/ exercises at home. Any changes in Ambulatory Summary Sheet? None        Objective: COVID screening questions were asked and patient attested that there had been no contact or symptoms    Almost to 0 hip ext in S/L, can lay leg flat supine. Quad min tightness but still TTP w/ tightness in psoas and pectineus, min into adductor too. Pt has good tech w/ exercises. No snapping w/ flex in standing. No hip ext past neutral or bridge for 6 weeks: 2/22/22  Exercises: (No more than 4 columns)   Exercise/Equipment 2/14/22 #3 2/17/22 #4 2/21/22  #5             WARM UP        Nustep  5'  5 lv 6 Lev 6, 5'           TABLE       Manual work  See below See below See below     H/L abd/ER   -    Prone lying    -    KTC  Ball 2 x 10 Ball 2 x 10 Ball 20x     Clam  10x2 X 10 YTB YTB 10x2     Hip abd and add iso  10x 5 ct ea. - -    abdom hold w/ add squeeze   - 10x5\"    Core: heel walk out  - 5x               STANDING       STS  20.5\" x20  20\" x20 20x           3-way steam boat  YTBx 10 flex/ abd  Ext to neutral no TB YTBx 10x2 flex/ abd  Ext to neutral no TB 20x    Mini squats       shuttle    2 cords B LE  X 10  1 cord L LE  X 10 B 2C 10x2  U 1 C 10x2 L    Side stepping w TB   30 ft ea. Dir YTB 30 ft ea. Dir YTB                PROPRIOCEPTION       SLS balance  30 s; 15 x 2 30\"    Tandem  30 s ea.  -    SLS airex  30 s x 2 30\"    airex mini squats   X 10 cued for proper technique -           MODALITIES       CP    10'                Other Therapeutic Activities/Education:  2/8/2022: Patient received education on their current pathology and how their condition effects them with their functional activities. Patient understood discussion and questions were answered. Patient understands their activity limitations and understands rational for treatment progression. Home Exercise Program:    Access Code: [email or web address]  URL: [email or web address]  Date: 02/08/2022  Prepared by: Olivia Riley    Exercises  Lying Prone - 1-2 x daily - 7 x weekly - 1-2 sets - 1-5 reps - minutes hold  Bent Knee Fallouts - 1-2 x daily - 7 x weekly - 1-2 sets - 10 reps  Hook Lying Single Knee to Chest Stretch with Towel - 1-2 x daily - 7 x weekly - 1-2 sets - 10 reps - 5-10 seconds hold  Clamshell - 1-2 x daily - 7 x weekly - 1-2 sets - 10 reps  Sit to Stand - 1-2 x daily - 7 x weekly - 1-2 sets - 10 reps    2/17/22 updated HEP TB for clams, side stepping w TB, standing SLR/ abd with TB, mini squats, SLS. HO given    Manual Treatments:  AAROM/PROM hip, L Psoas and proximal adductor STM release w/ STM/MFR/CFM, adductor too      Modalities:  CP to L hip, pt prone, CP strapped around L hips, 10'      Communication with other providers:  POC faxed 2/8/22      Assessment:  Pt demonstrated GOOD tolerance to tx session without adverse reactions or complications with increase flexibility in the groin and no pain without added exercises. Pt is making nice progress so far but continues w/ tightness in hip flexor, limited ROM and strength L LE. Pt would continue to benefit from skilled therapy interventions to address remaining impairments, improve mobility and strength and progress toward goal completion while reducing risk for re-injury or further decline. 0/10 pain after tx looser      Plan for Next Session:  continue manual work to psoas and begin on adductor prn as well, work into neutral hip ext for now and progress to more extension at the 6 week ivis. No bridges until 6 weeks please.   Advance hip strength to grace adding core work and eventually more functional movements to prepare for jogging w/ dog training      Time In / Time Out:  1415/1510       Timed Code/Total Treatment Minutes:  45/45     1 Man    2 TE   CP not billed       Next Progress Note due:  30 days       Plan of Care Interventions:  [x] Therapeutic Exercise  [x] Modalities:  [x] Therapeutic Activity     [] Ultrasound  [] Estim  [x] Gait Training      [] Cervical Traction [] Lumbar Traction  [x] Neuromuscular Re-education    [x] Cold/hotpack [] Iontophoresis   [x] Instruction in HEP      [] Vasopneumatic   [] Dry Needling    [x] Manual Therapy               [] Aquatic Therapy              Electronically signed by:  Whitley Alicea PT, PT, MPT, ATC   2/21/2022, 9:52 AM     2/21/2022, 4:18 PM

## 2022-02-22 NOTE — FLOWSHEET NOTE
Patients Plan of Care was received and signed. Signed POC was scanned and placed in the patients chart.     Rafael Roque

## 2022-02-23 ENCOUNTER — HOSPITAL ENCOUNTER (OUTPATIENT)
Dept: PHYSICAL THERAPY | Age: 62
Setting detail: THERAPIES SERIES
Discharge: HOME | End: 2022-02-23
Payer: COMMERCIAL

## 2022-02-23 PROCEDURE — 97110 THERAPEUTIC EXERCISES: CPT

## 2022-02-23 NOTE — FLOWSHEET NOTE
Outpatient Physical Therapy  [place]           [x] Phone: [phone]   Fax: [phone]  [place]           [] Phone: [phone]   Fax: [phone]        Physical Therapy Daily Treatment Note  Date:  2022    Patient Name:  Demetri Anderson    :  1960  MRN: [id number]  Restrictions/Precautions: Other position/activity restrictions: anterior hip precautions: no extension past neutral x 6 weeks  Diagnosis:   Diagnosis: L DARNELL  Date of Injury/Surgery: 22  Treatment Diagnosis: Treatment Diagnosis: L hip pain, tissue tightness, stiffness, weakness    Insurance/Certification information: PT Insurance Information: BCBS  $25, 76 PCY   Referring Physician:  Referring Practitioner: Norris Romberg CNP, Surgeon Starlett Sacks  Next Doctor Visit:  6 weeks post OP  Plan of care signed (Y/N):  Pending, re-faxed 22  Outcome Measure: KOOS 30%  Visit# / total visits:   -16 POC and script   Pain level: 0/10   Goals:     Patient goals : improve sleep, resume running for dog training, improve confidence in hip/LE  Short term goals  Time Frame for Short term goals: 3 weeks  Short term goal 1: Pt will be able to achieve neutral hip extension w/o pain or limit  Short term goal 2: Pt will be able to peform hip flex/ext ROM w/o snapping in groin  Long term goals  Time Frame for Long term goals : 6 weeks  Long term goal 1: Pt will be independent w/ HEP and able to continue to progress on her own  Long term goal 2: Pt will be able to begin progression back to jogging for dog training  Long term goal 3: Pt will be able to perform her usual activity w/o anterior hip pain or snapping  Long term goal 4: Pt will have improved HOOS by 10% or more    Summary of Evaluation: Assessment: Pt is a 60 yo female who presents 4 weeks postop L anterior DARNELL. She has Hx B knee AKS and some knee pain still on L as well as continued c/o groin pain and snapping in groin/anterior hip.   She demonstrates limited hip ext, tightness in anterior hip, weakness of gluts, core and hip w/ some pain and snapping too. These limitations are affecting her work as a  and usual activity. She will benefit from PT to help restore PLOF. 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 Subjective: Pt stated the groin tightness is minimal. Some qud soreness probably from doing the exercises. Able to stand at work all day with moderate fatigue at end of work day. Still experiencing the snapping    Any changes in Ambulatory Summary Sheet? None        Objective: COVID screening questions were asked and patient attested that there had been no contact or symptoms    Almost to 0 hip ext in S/L, can lay leg flat supine. Quad min tightness but still TTP w/ tightness in psoas and pectineus, min into adductor too. Pt has good tech w/ exercises. Joint instability/ popping from full flexion into ext. No pain  No pain with all exercises    No hip ext past neutral or bridge for 6 weeks: 2/22/22  Exercises: (No more than 4 columns)   Exercise/Equipment 2/14/22 #3 2/17/22 #4 2/21/22  #5 2/23/22 #6              WARM UP         Nustep  5'  5 lv 6 Lev 6, 5' Lev 6, 5'            TABLE        Manual work  See below See below See below   [address]   H/L abd/ER   -     Prone lying    -     KTC  Ball 2 x 10 Ball 2 x 10 Ball 20x  Ball 20x     Clam  10x2 X 10 YTB YTB 10x2  YTB 10x2     Hip abd and add iso  10x 5 ct ea. - -     abdom hold w/ add squeeze   - 10x5\" 10x5\"    Core: heel walk out  - 5x  10 x     bridge          STANDING    X 10     STS  20.5\" x20  20\" x20 20x 20x             3-way steam boat  YTBx 10 flex/ abd  Ext to neutral no TB YTBx 10x2 flex/ abd  Ext to neutral no TB 20x YTBx 10x2 flex/ abd  Ext to neutral no TB 20x    Mini squats        shuttle    2 cords B LE  X 10  1 cord L LE  X 10 B 2C 10x2  U 1 C 10x2 L B 2C 10x2  U 1 C 10x2 L    Side stepping w TB   30 ft ea. Dir YTB 30 ft ea.  Dir YTB 30 ft ea. Dir RTB                 PROPRIOCEPTION        SLS balance  30 s; 15 x 2 30\" 30 s x 2    Tandem  30 s ea. -     SLS airex  30 s x 2 30\" 30 s x 2    airex mini squats   X 10 cued for proper technique -             MODALITIES        CP    10'                  Other Therapeutic Activities/Education:  2/8/2022: Patient received education on their current pathology and how their condition effects them with their functional activities. Patient understood discussion and questions were answered. Patient understands their activity limitations and understands rational for treatment progression. Home Exercise Program:    Access Code: [email or web address]  URL: [email or web address]  Date: 02/08/2022  Prepared by: Jasson Flores    Exercises  Lying Prone - 1-2 x daily - 7 x weekly - 1-2 sets - 1-5 reps - minutes hold  Bent Knee Fallouts - 1-2 x daily - 7 x weekly - 1-2 sets - 10 reps  Hook Lying Single Knee to Chest Stretch with Towel - 1-2 x daily - 7 x weekly - 1-2 sets - 10 reps - 5-10 seconds hold  Clamshell - 1-2 x daily - 7 x weekly - 1-2 sets - 10 reps  Sit to Stand - 1-2 x daily - 7 x weekly - 1-2 sets - 10 reps    2/17/22 updated HEP TB for clams, side stepping w TB, standing SLR/ abd with TB, mini squats, SLS. HO given    Manual Treatments:      Modalities:    Communication with other providers:  POC faxed 2/8/22      Assessment:  Pt demonstrated GOOD tolerance to tx session without adverse reactions or complications with added bridges today. Did well without any pain and minimal quad fatigue. Joint instability still present with extension of hip from full flexion. Past 6 weeks yesterday, initiated bridging to tolerance with good form and no pain. Would benefit from more MT next session. Pt would continue to benefit from skilled therapy interventions to address remaining impairments, improve mobility and strength and progress toward goal completion while reducing risk for re-injury or further decline. 0/10 pain after tx looser      Plan for Next Session:  continue manual work to psoas/hip flexors and begin on adductor prn as well, work into neutral hip ext for now and progress to more extension at the 6 week ivis. No bridges until 6 weeks please.   Advance hip strength to grace adding core work and eventually more functional movements to prepare for jogging w/ dog training      Time In / Time Out:  1515/1600       Timed Code/Total Treatment Minutes:  45/45    3 TE          Next Progress Note due:  30 days       Plan of Care Interventions:  [x] Therapeutic Exercise  [x] Modalities:  [x] Therapeutic Activity     [] Ultrasound  [] Estim  [x] Gait Training      [] Cervical Traction [] Lumbar Traction  [x] Neuromuscular Re-education    [x] Cold/hotpack [] Iontophoresis   [x] Instruction in HEP      [] Vasopneumatic   [] Dry Needling    [x] Manual Therapy               [] Aquatic Therapy              Electronically signed by:  Judd Nam PTA, CLT   2/23/2022, 3:11 PM

## 2022-02-28 ENCOUNTER — HOSPITAL ENCOUNTER (OUTPATIENT)
Dept: PHYSICAL THERAPY | Age: 62
Setting detail: THERAPIES SERIES
Discharge: HOME | End: 2022-02-28
Payer: COMMERCIAL

## 2022-02-28 PROCEDURE — 97110 THERAPEUTIC EXERCISES: CPT

## 2022-02-28 NOTE — FLOWSHEET NOTE
Outpatient Physical Therapy  [place]           [x] Phone: [phone]   Fax: [phone]  [facility]           [] Phone: [phone]   Fax: [phone]        Physical Therapy Daily Treatment Note  Date:  2022    Patient Name:  Darylene Samples    :  1960  MRN: [id number]  Restrictions/Precautions: Other position/activity restrictions: anterior hip precautions: no extension past neutral x 6 weeks  Diagnosis:   Diagnosis: L DARNELL  Date of Injury/Surgery: 22  Treatment Diagnosis: Treatment Diagnosis: L hip pain, tissue tightness, stiffness, weakness    Insurance/Certification information: PT Insurance Information: BCBS  $25, 76 PCY   Referring Physician:  Referring Practitioner: Vinicio Bridges CNP, Surgeon Briana Liang  Next Doctor Visit:  6 weeks post OP  Plan of care signed (Y/N):  Pending, re-faxed 22  Outcome Measure: KOOS 30%  Visit# / total visits:   -16 POC and script   Pain level: 0/10   Goals:     Patient goals : improve sleep, resume running for dog training, improve confidence in hip/LE  Short term goals  Time Frame for Short term goals: 3 weeks  Short term goal 1: Pt will be able to achieve neutral hip extension w/o pain or limit  Short term goal 2: Pt will be able to peform hip flex/ext ROM w/o snapping in groin  Long term goals  Time Frame for Long term goals : 6 weeks  Long term goal 1: Pt will be independent w/ HEP and able to continue to progress on her own  Long term goal 2: Pt will be able to begin progression back to jogging for dog training  Long term goal 3: Pt will be able to perform her usual activity w/o anterior hip pain or snapping  Long term goal 4: Pt will have improved HOOS by 10% or more    Summary of Evaluation: Assessment: Pt is a 60 yo female who presents 4 weeks postop L anterior DARNELL. She has Hx B knee AKS and some knee pain still on L as well as continued c/o groin pain and snapping in groin/anterior hip.   She demonstrates limited hip ext, tightness in anterior hip, weakness of gluts, core and hip w/ some pain and snapping too. These limitations are affecting her work as a  and usual activity. She will benefit from PT to help restore PLOF. 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 Subjective: Pt stated still experiencing the snapping. She was able to sleep in her bed and lie flat. Lying on her R side is still uncomfortable. She gets shots in her     Any changes in Ambulatory Summary Sheet? None        Objective: COVID screening questions were asked and patient attested that there had been no contact or symptoms    Some pelvic transverse rotation today. Improved post MT   No change with clicking post MT. Almost to 0 hip ext in S/L, can lay leg flat supine. Quad min tightness but still TTP w/ tightness in psoas and pectineus, min into adductor too. Pt has good tech w/ exercises. Joint instability/ popping from full flexion into ext. No pain  No pain with all exercises    No hip ext past neutral or bridge for 6 weeks: 2/22/22  Exercises: (No more than 4 columns)   Exercise/Equipment 2/14/22 #3 2/17/22 #4 2/21/22  #5 2/23/22 #6 2/28/22 #7             WARM UP         Nustep  5'  5 lv 6 Lev 6, 5' Lev 6, 5'            TABLE        Manual work  See below See below See below   See below   H/L abd/ER   -     Prone lying    -     KTC  Ball 2 x 10 Ball 2 x 10 Ball 20x  Ball 20x  Ball 20x    Clam  10x2 X 10 YTB YTB 10x2  YTB 10x2  YTB 15x2   Hip abd and add iso  10x 5 ct ea.  - -     abdom hold w/ add squeeze   - 10x5\" 10x5\" 15x5\"   Core: heel walk out  - 5x  10 x     bridge      X 10  X 10    STANDING        STS  20.5\" x20  20\" x20 20x 20x  20 x           3-way steam boat  YTBx 10 flex/ abd  Ext to neutral no TB YTBx 10x2 flex/ abd  Ext to neutral no TB 20x YTBx 10x2 flex/ abd  Ext to neutral no TB 20x    Mini squats        shuttle    2 cords B LE  X 10  1 cord L LE  X 10 B 2C 10x2  U 1 C 10x2 L B 2C 10x2  U 1 C 10x2 L B 3C 15x2  U 2 C x 15 L   Side stepping w TB   30 ft ea. Dir YTB 30 ft ea. Dir YTB 30 ft ea. Dir RTB 30 ft ea. Dir RTB                PROPRIOCEPTION        SLS balance  30 s; 15 x 2 30\" 30 s x 2 30 s x 2   Tandem  30 s ea. -     SLS airex  30 s x 2 30\" 30 s x 2 30 s x 2   airex mini squats   X 10 cued for proper technique -             MODALITIES        CP    10'   10'               Other Therapeutic Activities/Education:  2/8/2022: Patient received education on their current pathology and how their condition effects them with their functional activities. Patient understood discussion and questions were answered. Patient understands their activity limitations and understands rational for treatment progression. Home Exercise Program:    Access Code: [email or web address]  URL: [email or web address]  Date: 02/08/2022  Prepared by: Yong Rump    Exercises  Lying Prone - 1-2 x daily - 7 x weekly - 1-2 sets - 1-5 reps - minutes hold  Bent Knee Fallouts - 1-2 x daily - 7 x weekly - 1-2 sets - 10 reps  Hook Lying Single Knee to Chest Stretch with Towel - 1-2 x daily - 7 x weekly - 1-2 sets - 10 reps - 5-10 seconds hold  Clamshell - 1-2 x daily - 7 x weekly - 1-2 sets - 10 reps  Sit to Stand - 1-2 x daily - 7 x weekly - 1-2 sets - 10 reps    2/17/22 updated HEP TB for clams, side stepping w TB, standing SLR/ abd with TB, mini squats, SLS. HO given    Manual Treatments:  AAROM/PROM hip, L Psoas and proximal adductor STM release w/ STM/MFR/CFM, adductor too      Modalities:  CP to L hip, pt prone, CP strapped around L hips, 10'  Communication with other providers:  POC faxed 2/8/22      Assessment:  Pt demonstrated GOOD tolerance to tx session without adverse reactions or complications. Popping present with extension of hip from full flexion. She did experience some tightening post MT with relief post CP. Able to sleep supine now. Would benefit from more MT next session.  Pt would continue to benefit from skilled therapy interventions to address remaining impairments, improve mobility and strength and progress toward goal completion while reducing risk for re-injury or further decline. 0/10 pain after tx tighter 3/10 soreness pres CP      Plan for Next Session:  continue manual work to psoas/hip flexors and begin on adductor prn as well, work into neutral hip ext for now and progress to more extension at the 6 week ivis. No bridges until 6 weeks please.   Advance hip strength to grace adding core work and eventually more functional movements to prepare for jogging w/ dog training      Time In / Time Out:  1022/1118     Timed Code/Total Treatment Minutes:  45/55    3 TE    10' CP not billed      Next Progress Note due:  30 days       Plan of Care Interventions:  [x] Therapeutic Exercise  [x] Modalities:  [x] Therapeutic Activity     [] Ultrasound  [] Estim  [x] Gait Training      [] Cervical Traction [] Lumbar Traction  [x] Neuromuscular Re-education    [x] Cold/hotpack [] Iontophoresis   [x] Instruction in HEP      [] Vasopneumatic   [] Dry Needling    [x] Manual Therapy               [] Aquatic Therapy              Electronically signed by:  Haylee Kovacs PTA, CLT   2/28/2022, 9:55 AM

## 2022-03-02 ENCOUNTER — HOSPITAL ENCOUNTER (OUTPATIENT)
Dept: PHYSICAL THERAPY | Age: 62
Setting detail: THERAPIES SERIES
Discharge: HOME | End: 2022-03-02
Payer: COMMERCIAL

## 2022-03-02 PROCEDURE — 97140 MANUAL THERAPY 1/> REGIONS: CPT

## 2022-03-02 PROCEDURE — 97110 THERAPEUTIC EXERCISES: CPT

## 2022-03-02 NOTE — FLOWSHEET NOTE
Outpatient Physical Therapy  [place]           [x] Phone: [phone]   Fax: [phone]  [facility] [place]           [] Phone: [phone]   Fax: [phone]        Physical Therapy Daily Treatment Note  Date:  3/2/2022    Patient Name:  Jewels Meade    :  1960  MRN: [id number]  Restrictions/Precautions: Other position/activity restrictions: anterior hip precautions: no extension past neutral x 6 weeks  Diagnosis:   Diagnosis: L DARNELL  Date of Injury/Surgery: 22  Treatment Diagnosis: Treatment Diagnosis: L hip pain, tissue tightness, stiffness, weakness    Insurance/Certification information: PT Insurance Information: BCBS  $25, 76 PCY   Referring Physician:  Referring Practitioner: Salo Cha CNP, Surgeon Katerina Woods  Next Doctor Visit:  6 weeks post OP  Plan of care signed (Y/N):  Pending, re-faxed 22  Outcome Measure: KOOS 30%  Visit# / total visits:   -16 POC and script   Pain level: 0/10   Goals:     Patient goals : improve sleep, resume running for dog training, improve confidence in hip/LE  Short term goals  Time Frame for Short term goals: 3 weeks  Short term goal 1: Pt will be able to achieve neutral hip extension w/o pain or limit  Short term goal 2: Pt will be able to peform hip flex/ext ROM w/o snapping in groin  Long term goals  Time Frame for Long term goals : 6 weeks  Long term goal 1: Pt will be independent w/ HEP and able to continue to progress on her own  Long term goal 2: Pt will be able to begin progression back to jogging for dog training  Long term goal 3: Pt will be able to perform her usual activity w/o anterior hip pain or snapping  Long term goal 4: Pt will have improved HOOS by 10% or more    Summary of Evaluation: Assessment: Pt is a 58 yo female who presents 4 weeks postop L anterior DARNELL. She has Hx B knee AKS and some knee pain still on L as well as continued c/o groin pain and snapping in groin/anterior hip.   She demonstrates limited hip ext, tightness in anterior hip, weakness of gluts, core and hip w/ some pain and snapping too. These limitations are affecting her work as a  and usual activity. She will benefit from PT to help restore PLOF. 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 Subjective: Pt states she just got gel injections in both of her knees yesterday. Currently not having any hip pain. Any changes in Ambulatory Summary Sheet? None        Objective: COVID screening questions were asked and patient attested that there had been no contact or symptoms    No popping or clicking in hip today with modified exercises. Pt has good tech w/ exercises. No pain with all exercises  Now able to sleep supine in bed without pillow under knees  No hip ext past neutral or bridge for 6 weeks: 2/22/22  Exercises: (No more than 4 columns)   Exercise/Equipment 2/21/22  #5 2/23/22 #6 2/28/22 #7 3/2/22 #8            WARM UP        Dickson Plank 6, 5' Lev 6, 5'  Hold today due to gel injections           TABLE       Manual work  See below   See below    H/L abd/ER -      Prone lying  -      KTC  Ball 20x  Ball 20x  Ball 20x  Ball 20x    Clam  YTB 10x2  YTB 10x2  YTB 15x2 YTB 15x2   Hip abd and add iso  -      abdom hold w/ add squeeze  10x5\" 10x5\" 15x5\" 15x5\"   Core: heel walk out 5x  10 x      bridge    X 10  X 10  10x   Supine hip flexor stretch    Right knee to chest, holding PPT and lowering left leg to table, 30 sec x 2          STANDING       STS  20x 20x  20 x Hold today due to knees          3-way steam boat YTBx 10x2 flex/ abd  Ext to neutral no TB 20x YTBx 10x2 flex/ abd  Ext to neutral no TB 20x     Mini squats       shuttle B 2C 10x2  U 1 C 10x2 L B 2C 10x2  U 1 C 10x2 L B 3C 15x2  U 2 C x 15 L Hold today due to knees   Side stepping w TB  30 ft ea. Dir YTB 30 ft ea. Dir RTB 30 ft ea. Dir RTB 30 ft ea.  Dir RTB               PROPRIOCEPTION       SLS balance 30\" 30 s x 2 30 s x 2 30 s x 2   Tandem -      SLS airex 30\" 30 s x 2 30 s x 2 30 s x 2   airex mini squats -             MODALITIES       CP  10'   10' 10'              Other Therapeutic Activities/Education:  2/8/2022: Patient received education on their current pathology and how their condition effects them with their functional activities. Patient understood discussion and questions were answered. Patient understands their activity limitations and understands rational for treatment progression. Home Exercise Program:    Access Code: [email or web address]  URL: [email or web address]  Date: 02/08/2022  Prepared by: Myrna Spindle    Exercises  Lying Prone - 1-2 x daily - 7 x weekly - 1-2 sets - 1-5 reps - minutes hold  Bent Knee Fallouts - 1-2 x daily - 7 x weekly - 1-2 sets - 10 reps  Hook Lying Single Knee to Chest Stretch with Towel - 1-2 x daily - 7 x weekly - 1-2 sets - 10 reps - 5-10 seconds hold  Clamshell - 1-2 x daily - 7 x weekly - 1-2 sets - 10 reps  Sit to Stand - 1-2 x daily - 7 x weekly - 1-2 sets - 10 reps    2/17/22 updated HEP TB for clams, side stepping w TB, standing SLR/ abd with TB, mini squats, SLS. HO given    Manual Treatments:  AAROM/PROM hip, STM to quads,  Psoas and proximal adductor       Modalities:  CP to L hip, pt prone position x 10'  Communication with other providers:  POC faxed 2/8/22      Assessment:  Pt demonstrated overall good tolerance to today's session without adverse reactions. Did modify exercises today due to just having gel injections in both of her knees yesterday. No popping or clicking in hip noted today. Pt would continue to benefit from skilled therapy interventions to address remaining impairments, improve mobility and strength and progress toward goal completion while reducing risk for re-injury or further decline.      0/10 pain after tx - reports of less tightness      Plan for Next Session:  continue manual work to psoas/hip flexors and begin on adductor prn as well, work into neutral hip ext for now and progress to more extension at the 6 week ivis. No bridges until 6 weeks please.   Advance hip strength to grace adding core work and eventually more functional movements to prepare for jogging w/ dog training      Time In / Time Out:  1120/1212     Timed Code/Total Treatment Minutes:  42/52    (2) TE, (1) MT - CP not billed      Next Progress Note due:  30 days       Plan of Care Interventions:  [x] Therapeutic Exercise  [x] Modalities:  [x] Therapeutic Activity     [] Ultrasound  [] Estim  [x] Gait Training      [] Cervical Traction [] Lumbar Traction  [x] Neuromuscular Re-education    [x] Cold/hotpack [] Iontophoresis   [x] Instruction in HEP      [] Vasopneumatic   [] Dry Needling    [x] Manual Therapy               [] Aquatic Therapy              Electronically signed by:  Hazel Piper   3/2/2022, 11:14 AM

## 2022-03-07 ENCOUNTER — HOSPITAL ENCOUNTER (OUTPATIENT)
Dept: PHYSICAL THERAPY | Age: 62
Setting detail: THERAPIES SERIES
Discharge: HOME | End: 2022-03-07
Payer: COMMERCIAL

## 2022-03-07 PROCEDURE — 97110 THERAPEUTIC EXERCISES: CPT

## 2022-03-07 PROCEDURE — 97140 MANUAL THERAPY 1/> REGIONS: CPT

## 2022-03-07 NOTE — FLOWSHEET NOTE
Outpatient Physical Therapy  [place]           [x] Phone: [phone]   Fax: [phone]  [facility]           [] Phone: [phone]   Fax: [phone]        Physical Therapy Daily Treatment Note  Date:  3/7/2022    Patient Name:  Kody Mak    :  1960  MRN: [id number]  Restrictions/Precautions: Other position/activity restrictions: anterior hip precautions: no extension past neutral x 6 weeks  Diagnosis:   Diagnosis: L DARNELL  Date of Injury/Surgery: 22  Treatment Diagnosis: Treatment Diagnosis: L hip pain, tissue tightness, stiffness, weakness    Insurance/Certification information: PT Insurance Information: BCBS  $25, 76 PCY   Referring Physician:  Referring Practitioner: Constantine Basurto CNP, Surgeon Aman Esqueda  Next Doctor Visit:  3/11  Plan of care signed (Y/N):  yes  Outcome Measure: HOOS 30%  Visit# / total visits:   -16 POC and script, pending PN for more prn  Pain level: 0/10   Goals:     Patient goals : improve sleep, resume running for dog training, improve confidence in hip/LE  Short term goals  Time Frame for Short term goals: 3 weeks  Short term goal 1: Pt will be able to achieve neutral hip extension w/o pain or limit  Met   Short term goal 2: Pt will be able to peform hip flex/ext ROM w/o snapping in groin  Mostly met  Long term goals  Time Frame for Long term goals : 6 weeks  Long term goal 1: Pt will be independent w/ HEP and able to continue to progress on her own  Good progress   Long term goal 2: Pt will be able to begin progression back to jogging for dog training  Partially met  Long term goal 3: Pt will be able to perform her usual activity w/o anterior hip pain or snapping  Partially met   Long term goal 4: Pt will have improved HOOS by 10% or more   Met  HOOS 30% at [facility], 3/7/22: 10%      Summary of Evaluation: Assessment: Pt is a 58 yo female who presents 4 weeks postop L anterior DARNELL.   She has Hx B knee AKS and some knee pain still on L as well as continued c/o groin pain and snapping in groin/anterior hip. She demonstrates limited hip ext, tightness in anterior hip, weakness of gluts, core and hip w/ some pain and snapping too. These limitations are affecting her work as a  and usual activity. She will benefit from PT to help restore PLOF. 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 Subjective:   No pain in hip currently, knees still bothering more than hip. Really not much hip pain to report at home. Any changes in Ambulatory Summary Sheet? None        Objective: COVID screening questions were asked and patient attested that there had been no contact or symptoms      AROM L hip flex [id number]°, hip ext standing 10°  MMT knee WNL, hip grossly WNL   Ambulates w/o device w/ mild antagic gait, but more d/t kne epain  No popping or clicking in hip today with modified exercises, some w/ full hip ROM. Pt has good tech w/ exercises. Now able to sleep supine in bed without pillow under knees  She has TTP w/ tightness in adductor on L and min at pectineus now. No hip ext past neutral or bridge for 6 weeks: 2/22/22  Exercises: (No more than 4 columns)   Exercise/Equipment 2/28/22 #7 3/2/22 #8 3/7/22 #9             WARM UP        Nustep   Hold today due to gel injections  Lev 6, 5'           TABLE       Manual work  See below  See below     KTC  Ball 20x  Ball 20x  Ball 20x    Clam  YTB 15x2 YTB 15x2 RTB 15x2     abdom hold w/ add squeeze  15x5\" 15x5\"     abdom hold w/ march    10x2 ea LE    Core: heel walk out        bridge   X 10  10x 10x2     Supine hip flexor stretch  Right knee to chest, holding PPT and lowering left leg to table, 30 sec x 2 S/L w/CR 10x           STANDING       STS  20 x Hold today due to knees 10x    Marching for distance   NEXT     3-way steam boat   -    Mini squats   -    shuttle B 3C 15x2  U 2 C x 15 L Hold today due to knees -    Side stepping w TB  30 ft ea. Dir RTB 30 ft ea.  Dir RTB 40ft ea way RTB    Step ups ant   Lat    NEXT                               PROPRIOCEPTION       SLS balance 30 s x 2 30 s x 2 -    Tandem   -    SLS airex 30 s x 2 30 s x 2 30 s x 2    airex mini squats   -           MODALITIES       CP  10' 10' 10'               Other Therapeutic Activities/Education:  2/8/2022: Patient received education on their current pathology and how their condition effects them with their functional activities. Patient understood discussion and questions were answered. Patient understands their activity limitations and understands rational for treatment progression. Home Exercise Program:    Access Code: [email or web address]  URL: [employer]/  Date: 02/08/2022  Prepared by: Doyle Spar    Exercises  Lying Prone - 1-2 x daily - 7 x weekly - 1-2 sets - 1-5 reps - minutes hold  Bent Knee Fallouts - 1-2 x daily - 7 x weekly - 1-2 sets - 10 reps  Hook Lying Single Knee to Chest Stretch with Towel - 1-2 x daily - 7 x weekly - 1-2 sets - 10 reps - 5-10 seconds hold  Clamshell - 1-2 x daily - 7 x weekly - 1-2 sets - 10 reps  Sit to Stand - 1-2 x daily - 7 x weekly - 1-2 sets - 10 reps    2/17/22 updated HEP TB for clams, side stepping w TB, standing SLR/ abd with TB, mini squats, SLS. HO given      Manual Treatments:  AAROM/PROM hip, STM to psoas/pectniues and proximal adductor (moslty), man stretching      Modalities:  CP to L hip, pt recumb x 10'  Communication with other providers:  POC faxed 2/8/22, See note 3/7/22      Assessment:  Pt demonstrated overall good tolerance to today's session without adverse reactions. She is progressing well w/ ROM and function but continues w/ soft tissue restrictions in pectineus and adductor, limited ROM and decreased functional strength especially for higher level activity.   Pt would continue to benefit from skilled therapy interventions to address remaining impairments, improve mobility and strength and progress toward goal completion while reducing risk for re-injury or further decline.      0/10 pain after tx - reports of less tightness      Plan for Next Session:  continue manual work prn, Advance hip strength to grace adding core work and functional movements to prepare for jogging w/ dog training      Time In / Time Out:  1500/1555      Timed Code/Total Treatment Minutes:   45/45    (2) TE, (1) MT - CP not billed      Next Progress Note due:  See note 3/7/22      Plan of Care Interventions:  [x] Therapeutic Exercise  [x] Modalities:  [x] Therapeutic Activity     [] Ultrasound  [] Estim  [x] Gait Training      [] Cervical Traction [] Lumbar Traction  [x] Neuromuscular Re-education    [x] Cold/hotpack [] Iontophoresis   [x] Instruction in HEP      [] Vasopneumatic   [] Dry Needling    [x] Manual Therapy               [] Aquatic Therapy              Electronically signed by:  Gerri Tristan PT,  PT, MPT, ATC    3/7/2022, 9:43 AM      3/7/2022, 4:32 PM

## 2022-03-09 ENCOUNTER — HOSPITAL ENCOUNTER (OUTPATIENT)
Dept: PHYSICAL THERAPY | Age: 62
Setting detail: THERAPIES SERIES
Discharge: HOME | End: 2022-03-09
Payer: COMMERCIAL

## 2022-03-09 PROCEDURE — 97140 MANUAL THERAPY 1/> REGIONS: CPT

## 2022-03-09 PROCEDURE — 97110 THERAPEUTIC EXERCISES: CPT

## 2022-03-09 NOTE — FLOWSHEET NOTE
Outpatient Physical Therapy  [place]           [x] Phone: [phone]   Fax: [phone]  [facility]           [] Phone: [phone]   Fax: [phone]        Physical Therapy Daily Treatment Note  Date:  3/9/2022    Patient Name:  Melissa Holden    :  1960  MRN: [id number]  Restrictions/Precautions: Other position/activity restrictions: anterior hip precautions: no extension past neutral x 6 weeks  Diagnosis:   Diagnosis: L DARNELL  Date of Injury/Surgery: 22  Treatment Diagnosis: Treatment Diagnosis: L hip pain, tissue tightness, stiffness, weakness    Insurance/Certification information: PT Insurance Information: BCBS  $25, 76 PCY   Referring Physician:  Referring Practitioner: Naga Lebron CNP, Surgeon Diana Dunbar  Next Doctor Visit:  3/11  Plan of care signed (Y/N):  yes  Outcome Measure: HOOS 30%  Visit# / total visits:   10/12- POC and script, pending PN for more prn  Pain level: 0/10 4/10 R/L knee  Goals:     Patient goals : improve sleep, resume running for dog training, improve confidence in hip/LE  Short term goals  Time Frame for Short term goals: 3 weeks  Short term goal 1: Pt will be able to achieve neutral hip extension w/o pain or limit  Met   Short term goal 2: Pt will be able to peform hip flex/ext ROM w/o snapping in groin  Mostly met  Long term goals  Time Frame for Long term goals : 6 weeks  Long term goal 1: Pt will be independent w/ HEP and able to continue to progress on her own  Good progress   Long term goal 2: Pt will be able to begin progression back to jogging for dog training  Partially met  Long term goal 3: Pt will be able to perform her usual activity w/o anterior hip pain or snapping  Partially met   Long term goal 4: Pt will have improved HOOS by 10% or more   Met  HOOS 30% at [facility], 3/7/22: 10%      Summary of Evaluation: Assessment: Pt is a 58 yo female who presents 4 weeks postop L anterior DARNELL.   She has Hx B knee AKS and some knee pain still on L as well as continued c/o groin pain and snapping in groin/anterior hip. She demonstrates limited hip ext, tightness in anterior hip, weakness of gluts, core and hip w/ some pain and snapping too. These limitations are affecting her work as a  and usual activity. She will benefit from PT to help restore PLOF. 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 Subjective:  Pt stated she feels she doesn't experience the popping throughout the day but does with the exercises. No pain in hip currently, knees are barking a little. Overall she thinks her hip is getting a little better. Sleeping in bed is still a challenge    Any changes in Ambulatory Summary Sheet? None        Objective: COVID screening questions were asked and patient attested that there had been no contact or symptoms      MMT knee WNL, hip grossly WNL   Ambulates w/o device w/ mild antagic gait, but more d/t kne epain  Popping or clicking in hip today with marching some w/ full hip ROM. Pt has good tech w/ exercises. Now able to sleep supine in bed without pillow under knees  She has TTP w/ tightness in adductor on L and min at pectineus now. Exercises: (No more than 4 columns)   Exercise/Equipment 2/28/22 #7 3/2/22 #8 3/7/22 #9 3/9/22 #10            WARM UP        Nustep   Hold today due to gel injections  Lev 6, 5'    Elliptical    5' lv1   TABLE       Manual work  See below  See below     KTC  Ball 20x  Ball 20x  Ball 20x    Clam  YTB 15x2 YTB 15x2 RTB 15x2     abdom hold w/ add squeeze  15x5\" 15x5\"     abdom hold w/ march    10x2 ea LE    Core: heel walk out        bridge   X 10  10x 10x2     Supine hip flexor stretch  Right knee to chest, holding PPT and lowering left leg to table, 30 sec x 2 S/L w/CR 10x Kneeling hip flexor stretch with knee on airex.   30s x 2          STANDING       STS  20 x Hold today due to knees 10x    Marching for distance   NEXT  Small lap  Some snapping ecc motion   3-way steam boat   - Mini squats   -    shuttle B 3C 15x2  U 2 C x 15 L Hold today due to knees -    Side stepping w TB  30 ft ea. Dir RTB 30 ft ea. Dir RTB 40ft ea way RTB    Step ups ant   Lat    NEXT  X 20 ea. Cued for hip to neutral in stand                             PROPRIOCEPTION       SLS balance 30 s x 2 30 s x 2 -    Tandem   -    SLS airex 30 s x 2 30 s x 2 30 s x 2 30 s x2   airex mini squats   -           MODALITIES       CP  10' 10' 10' x              Other Therapeutic Activities/Education:  2/8/2022: Patient received education on their current pathology and how their condition effects them with their functional activities. Patient understood discussion and questions were answered. Patient understands their activity limitations and understands rational for treatment progression. Home Exercise Program:    Access Code: [email or web address]  URL: [email or web address]  Date: 02/08/2022  Prepared by: Rebecca Alcala    Exercises  Lying Prone - 1-2 x daily - 7 x weekly - 1-2 sets - 1-5 reps - minutes hold  Bent Knee Fallouts - 1-2 x daily - 7 x weekly - 1-2 sets - 10 reps  Hook Lying Single Knee to Chest Stretch with Towel - 1-2 x daily - 7 x weekly - 1-2 sets - 10 reps - 5-10 seconds hold  Clamshell - 1-2 x daily - 7 x weekly - 1-2 sets - 10 reps  Sit to Stand - 1-2 x daily - 7 x weekly - 1-2 sets - 10 reps    2/17/22 updated HEP TB for clams, side stepping w TB, standing SLR/ abd with TB, mini squats, SLS. HO given      Manual Treatments:  AAROM/PROM hip, STM to psoas/pectineus and proximal adductor (moslty), obsturator, man stretching      Modalities:    Communication with other providers:  POC faxed 2/8/22, See note 3/7/22      Assessment:  Pt demonstrated overall good tolerance to today's session without adverse reactions. Reports no poppingin gher daily routine just with exercises here. Good tolerance to MT today with some pectineus release. .  Pt would continue to benefit from skilled therapy interventions to address remaining impairments, improve mobility and strength and progress toward goal completion while reducing risk for re-injury or further decline.      0/10 pain after tx - looser \"feels better\"      Plan for Next Session:  continue manual work prn, Advance hip strength to grace adding core work and functional movements to prepare for jogging w/ dog training      Time In / Time Out:  7905/5868      Timed Code/Total Treatment Minutes:   45/45    (1) TE, (2) MT      Next Progress Note due:  See note 3/7/22      Plan of Care Interventions:  [x] Therapeutic Exercise  [x] Modalities:  [x] Therapeutic Activity     [] Ultrasound  [] Estim  [x] Gait Training      [] Cervical Traction [] Lumbar Traction  [x] Neuromuscular Re-education    [x] Cold/hotpack [] Iontophoresis   [x] Instruction in HEP      [] Vasopneumatic   [] Dry Needling    [x] Manual Therapy               [] Aquatic Therapy              Electronically signed by:  Roseann Mayer PTA, CLT    3/9/2022, 3:59 PM

## 2022-03-09 NOTE — FLOWSHEET NOTE
Patients Plan of Care was received and signed. Signed POC was scanned and placed in the patients chart.     Chet Aquino

## 2022-03-14 ENCOUNTER — HOSPITAL ENCOUNTER (OUTPATIENT)
Dept: PHYSICAL THERAPY | Age: 62
Setting detail: THERAPIES SERIES
Discharge: HOME | End: 2022-03-14
Payer: COMMERCIAL

## 2022-03-14 PROCEDURE — 97140 MANUAL THERAPY 1/> REGIONS: CPT

## 2022-03-14 PROCEDURE — 97110 THERAPEUTIC EXERCISES: CPT

## 2022-03-14 NOTE — FLOWSHEET NOTE
Outpatient Physical Therapy  [place]           [x] Phone: [phone]   Fax: [phone]  [facility]           [] Phone: [phone]   Fax: [phone]        Physical Therapy Daily Treatment Note  Date:  3/14/2022    Patient Name:  Eren Adams    :  1960  MRN: [id number]  Restrictions/Precautions: Other position/activity restrictions: anterior hip precautions: no extension past neutral x 6 weeks  Diagnosis:   Diagnosis: L DARNELL  Date of Injury/Surgery: 22  Treatment Diagnosis: Treatment Diagnosis: L hip pain, tissue tightness, stiffness, weakness    Insurance/Certification information: PT Insurance Information: BCBS  $25, 76 PCY   Referring Physician:  Referring Practitioner: Mele iLma CNP, Surgeon Ibeth Chaidez  Next Doctor Visit:  3/11  Plan of care signed (Y/N):  yes  Outcome Measure: HOOS 30%  Visit# / total visits:   10/12- POC and script, pending PN for more prn  Pain level: 0/10 hip;   Bilat knees 3/10    Goals:     Patient goals : improve sleep, resume running for dog training, improve confidence in hip/LE  Short term goals  Time Frame for Short term goals: 3 weeks  Short term goal 1: Pt will be able to achieve neutral hip extension w/o pain or limit  Met   Short term goal 2: Pt will be able to peform hip flex/ext ROM w/o snapping in groin  Mostly met  Long term goals  Time Frame for Long term goals : 6 weeks  Long term goal 1: Pt will be independent w/ HEP and able to continue to progress on her own  Good progress   Long term goal 2: Pt will be able to begin progression back to jogging for dog training  Partially met  Long term goal 3: Pt will be able to perform her usual activity w/o anterior hip pain or snapping  Partially met   Long term goal 4: Pt will have improved HOOS by 10% or more   Met  HOOS 30% at [facility], 3/7/22: 10%      Summary of Evaluation: Assessment: Pt is a 58 yo female who presents 4 weeks postop L anterior DARNELL.   She has Hx B knee AKS and some knee pain still on L as well as continued c/o groin pain and snapping in groin/anterior hip. She demonstrates limited hip ext, tightness in anterior hip, weakness of gluts, core and hip w/ some pain and snapping too. These limitations are affecting her work as a  and usual activity. She will benefit from PT to help restore PLOF. 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 Subjective:  Pt arrives to tx session reporting 0/10 pain in L hip. Any changes in Ambulatory Summary Sheet? None        Objective: COVID screening questions were asked and patient attested that there had been no contact or symptoms    Ambulates w/o device w/ mild antagic gait, but more d/t kne epain  Popping or clicking in hip today with marching some w/ full hip ROM. Pt has good tech w/ exercises. Exercises: (No more than 4 columns)   Exercise/Equipment 3/2/22 #8 3/7/22 #9 3/9/22 #10 3/14/22 #11            WARM UP        Nustep  Hold today due to gel injections  Lev 6, 5'  Lev 6, 5'   Elliptical   5' lv1    TABLE       Manual work   See below   See below    KTC  Ball 20x  Ball 20x     Clam  YTB 15x2 RTB 15x2   RTB 15x2    abdom hold w/ add squeeze  15x5\"      abdom hold w/ march   10x2 ea LE     Core: heel walk out        bridge   10x 10x2   10x2    Supine hip flexor stretch Right knee to chest, holding PPT and lowering left leg to table, 30 sec x 2 S/L w/CR 10x Kneeling hip flexor stretch with knee on airex. 30s x 2           STANDING       STS  Hold today due to knees 10x  x10   Marching for distance  NEXT  Small lap  Some snapping ecc motion Small lap     3-way steam boat  -     Mini squats  -     shuttle Hold today due to knees -     Side stepping w TB  30 ft ea. Dir RTB 40ft ea way RTB     Step ups ant   Lat   NEXT  X 20 ea.  Cued for hip to neutral in stand X20 ea                             PROPRIOCEPTION       SLS balance 30 s x 2 -     Tandem  -     SLS airex 30 s x 2 30 s x 2 30 s x2 30 s x2   airex mini squats  -            MODALITIES       CP  10' 10' x               Other Therapeutic Activities/Education:  2/8/2022: Patient received education on their current pathology and how their condition effects them with their functional activities. Patient understood discussion and questions were answered. Patient understands their activity limitations and understands rational for treatment progression. Home Exercise Program:    Access Code: [email or web address]  URL: [email or web address]  Date: 02/08/2022  Prepared by: Lauryn Ang    Exercises  Lying Prone - 1-2 x daily - 7 x weekly - 1-2 sets - 1-5 reps - minutes hold  Bent Knee Fallouts - 1-2 x daily - 7 x weekly - 1-2 sets - 10 reps  Hook Lying Single Knee to Chest Stretch with Towel - 1-2 x daily - 7 x weekly - 1-2 sets - 10 reps - 5-10 seconds hold  Clamshell - 1-2 x daily - 7 x weekly - 1-2 sets - 10 reps  Sit to Stand - 1-2 x daily - 7 x weekly - 1-2 sets - 10 reps    2/17/22 updated HEP TB for clams, side stepping w TB, standing SLR/ abd with TB, mini squats, SLS. HO given      Manual Treatments:  AAROM/PROM hip, STM to psoas/pectineus and proximal adductor (moslty), obsturator, man stretching      Modalities:  CP to L hip, pt recumb x 10'  Communication with other providers:  POC faxed 2/8/22, See note 3/7/22      Assessment:  Pt demonstrated overall good tolerance to today's session without adverse reactions. Tolerates manual work well and demos decreased tightness. States that she feels some snapping in hip during distance marching but not pain. Pt would continue to benefit from skilled therapy interventions to address remaining impairments, improve mobility and strength and progress toward goal completion while reducing risk for re-injury or further decline.      0/10 pain after tx      Plan for Next Session:  continue manual work prn, Advance hip strength to grace adding core work and functional movements to prepare for jogging w/ dog training      Time In / Time Out:  7298 / 0441    Timed Code/Total Treatment Minutes:  37' / 48'   1 Man  2 TE        Next Progress Note due:  See note 3/7/22      Plan of Care Interventions:  [x] Therapeutic Exercise  [x] Modalities:  [x] Therapeutic Activity     [] Ultrasound  [] Estim  [x] Gait Training      [] Cervical Traction [] Lumbar Traction  [x] Neuromuscular Re-education    [x] Cold/hotpack [] Iontophoresis   [x] Instruction in HEP      [] Vasopneumatic   [] Dry Needling    [x] Manual Therapy               [] Aquatic Therapy              Electronically signed by:  Rosales Hernandez PTA,   3/14/2022, 1:23 PM

## 2022-03-15 DIAGNOSIS — E89.0 POSTOPERATIVE HYPOTHYROIDISM: ICD-10-CM

## 2022-03-16 ENCOUNTER — HOSPITAL ENCOUNTER (OUTPATIENT)
Dept: PHYSICAL THERAPY | Age: 62
Setting detail: THERAPIES SERIES
Discharge: HOME | End: 2022-03-16
Payer: COMMERCIAL

## 2022-03-16 PROCEDURE — 97110 THERAPEUTIC EXERCISES: CPT

## 2022-03-16 PROCEDURE — 97140 MANUAL THERAPY 1/> REGIONS: CPT

## 2022-03-16 RX ORDER — LEVOTHYROXINE SODIUM 112 MCG
TABLET ORAL
Qty: 90 TABLET | Refills: 3 | OUTPATIENT
Start: 2022-03-16

## 2022-03-16 NOTE — FLOWSHEET NOTE
Outpatient Physical Therapy  [place]           [x] Phone: [phone]   Fax: [phone]  [place]           [] Phone: [phone]   Fax: [phone]        Physical Therapy Daily Treatment Note  Date:  3/16/2022    Patient Name:  Demetri Anderson    :  1960  MRN: [id number]  Restrictions/Precautions: Other position/activity restrictions: anterior hip precautions: no extension past neutral x 6 weeks  Diagnosis:   Diagnosis: L DARNELL  Date of Injury/Surgery: 22  Treatment Diagnosis: Treatment Diagnosis: L hip pain, tissue tightness, stiffness, weakness    Insurance/Certification information: PT Insurance Information: BCBS  $25, 76 PCY   Referring Physician:  Referring Practitioner: Norris Romberg CNP, Surgeon Starlett Sacks  Next Doctor Visit:  3/11  Plan of care signed (Y/N):  yes  Outcome Measure: HOOS 30%  Visit# / total visits:   10/12- POC and script, pending PN for more prn  Pain level: 0/10 hip;   Bilat knees 5/10    Goals:     Patient goals : improve sleep, resume running for dog training, improve confidence in hip/LE  Short term goals  Time Frame for Short term goals: 3 weeks  Short term goal 1: Pt will be able to achieve neutral hip extension w/o pain or limit  Met   Short term goal 2: Pt will be able to peform hip flex/ext ROM w/o snapping in groin  Mostly met  Long term goals  Time Frame for Long term goals : 6 weeks  Long term goal 1: Pt will be independent w/ HEP and able to continue to progress on her own  Good progress   Long term goal 2: Pt will be able to begin progression back to jogging for dog training  Partially met  Long term goal 3: Pt will be able to perform her usual activity w/o anterior hip pain or snapping  Partially met   Long term goal 4: Pt will have improved HOOS by 10% or more   Met  HOOS 30% at [facility], 3/7/22: 10%      Summary of Evaluation: Assessment: Pt is a 60 yo female who presents 4 weeks postop L anterior DARNELL.   She has Hx B knee AKS and some knee pain still on L as well as continued c/o groin pain and snapping in groin/anterior hip. She demonstrates limited hip ext, tightness in anterior hip, weakness of gluts, core and hip w/ some pain and snapping too. These limitations are affecting her work as a  and usual activity. She will benefit from PT to help restore PLOF. 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 Subjective:  Pt states she did get gel injections in her knees yesterday. Has been too busy yesterday and today to really give her knees a rest. Having increase knee pain this morning from going up and down steps trying to get ready to leave for the weekend. Any changes in Ambulatory Summary Sheet? None        Objective: COVID screening questions were asked and patient attested that there had been no contact or symptoms    Ambulates w/o device w/  antagic gait, but more d/t knee pain  Modified treatment today due to receiving gel injections yesterday in her knees. Exercises: (No more than 4 columns)   Exercise/Equipment 3/7/22 #9 3/9/22 #10 3/14/22 #11 3/16/22 #12            WARM UP        [employer] C[employer]er 6, 5'  Lev 6, 5' Hold today   Elliptical  5' lv1     TABLE       Manual work  See below   See below  See below   KTC  Ball 20x      Clam  RTB 15x2   RTB 15x2  RTB 15x2    abdom hold w/ add squeeze        abdom hold w/ march  10x2 ea LE      Core: heel walk out        bridge   10x2   10x2  10x2   Supine hip flexor stretch S/L w/CR 10x Kneeling hip flexor stretch with knee on airex. 30s x 2            STANDING       STS  10x  x10 Hold today   Marching for distance NEXT  Small lap  Some snapping ecc motion Small lap      3-way steam boat -      Mini squats -      shuttle -      Side stepping w TB  40ft ea way RTB      Step ups ant   Lat  NEXT  X 20 ea.  Cued for hip to neutral in stand X20 ea Hold today                             PROPRIOCEPTION       SLS balance -      Tandem -      SLS airex 30 s x 2 30 s x2 30 s x2 Hold today   airex mini squats -             MODALITIES       CP  10' x                Other Therapeutic Activities/Education:  2/8/2022: Patient received education on their current pathology and how their condition effects them with their functional activities. Patient understood discussion and questions were answered. Patient understands their activity limitations and understands rational for treatment progression. Home Exercise Program:    Access Code: [email or web address]  URL: [email or web address]/  Date: 02/08/2022  Prepared by: Olivia Riley    Exercises  Lying Prone - 1-2 x daily - 7 x weekly - 1-2 sets - 1-5 reps - minutes hold  Bent Knee Fallouts - 1-2 x daily - 7 x weekly - 1-2 sets - 10 reps  Hook Lying Single Knee to Chest Stretch with Towel - 1-2 x daily - 7 x weekly - 1-2 sets - 10 reps - 5-10 seconds hold  Clamshell - 1-2 x daily - 7 x weekly - 1-2 sets - 10 reps  Sit to Stand - 1-2 x daily - 7 x weekly - 1-2 sets - 10 reps    2/17/22 updated HEP TB for clams, side stepping w TB, standing SLR/ abd with TB, mini squats, SLS. HO given      Manual Treatments:  AAROM/PROM hip, STM to psoas/pectineus and proximal adductor (moslty), manual stretching      Modalities:    Communication with other providers:  POC faxed 2/8/22, See note 3/7/22      Assessment:  Pt demonstrated overall good tolerance to today's session without adverse reactions. Did modify treatment today due to receiving gel injections yesterday in her knees. Tolerates manual well with less discomfort and tightness following. Will continue to benefit from skilled therapy interventions to address remaining impairments, improve mobility and strength and progress toward goal completion while reducing risk for re-injury or further decline.      0/10  Hip pain after tx       Plan for Next Session:  continue manual work prn, Advance hip strength to grace adding core work and functional movements to prepare for jogging w/ dog training      Time In / Time Out:  1125 / 1200    Timed Code/Total Treatment Minutes:  35/35   (1) TE, (1) MT        Next Progress Note due:  See note 3/7/22      Plan of Care Interventions:  [x] Therapeutic Exercise  [x] Modalities:  [x] Therapeutic Activity     [] Ultrasound  [] Estim  [x] Gait Training      [] Cervical Traction [] Lumbar Traction  [x] Neuromuscular Re-education    [x] Cold/hotpack [] Iontophoresis   [x] Instruction in HEP      [] Vasopneumatic   [] Dry Needling    [x] Manual Therapy               [] Aquatic Therapy              Electronically signed by:  Sydell Goodell, SOLEDAD  3/16/2022, 11:16 AM

## 2022-03-21 ENCOUNTER — HOSPITAL ENCOUNTER (OUTPATIENT)
Dept: PHYSICAL THERAPY | Age: 62
Setting detail: THERAPIES SERIES
Discharge: HOME | End: 2022-03-21
Payer: COMMERCIAL

## 2022-03-21 PROCEDURE — 97110 THERAPEUTIC EXERCISES: CPT

## 2022-03-21 PROCEDURE — 97140 MANUAL THERAPY 1/> REGIONS: CPT

## 2022-03-21 NOTE — FLOWSHEET NOTE
Outpatient Physical Therapy  [place]           [x] Phone: [phone]   Fax: [phone]  [place] [facility]           [] Phone: [phone]   Fax: [phone]        Physical Therapy Daily Treatment Note  Date:  3/21/2022    Patient Name:  Lawanda Llanos    :  1960  MRN: [id number]  Restrictions/Precautions: Other position/activity restrictions: anterior hip precautions: no extension past neutral x 6 weeks  Diagnosis:   Diagnosis: L DARNELL  Date of Injury/Surgery: 22  Treatment Diagnosis: Treatment Diagnosis: L hip pain, tissue tightness, stiffness, weakness    Insurance/Certification information: PT Insurance Information: BCBS  $25, 76 PCY   Referring Physician:  Referring Practitioner: Aliyah Jaramillo CNP, Surgeon Girma Benavides  Next Doctor Visit:  3/11  Plan of care signed (Y/N):  yes  Outcome Measure: HOOS 30%  Visit# / total visits:   -16 POC and script, pending PN for more prn  Pain level: 0/10 hip;   Bilat knees 1/10    Goals:     Patient goals : improve sleep, resume running for dog training, improve confidence in hip/LE  Short term goals  Time Frame for Short term goals: 3 weeks  Short term goal 1: Pt will be able to achieve neutral hip extension w/o pain or limit  Met   Short term goal 2: Pt will be able to peform hip flex/ext ROM w/o snapping in groin  Mostly met  Long term goals  Time Frame for Long term goals : 6 weeks  Long term goal 1: Pt will be independent w/ HEP and able to continue to progress on her own  Good progress   Long term goal 2: Pt will be able to begin progression back to jogging for dog training  Partially met  Long term goal 3: Pt will be able to perform her usual activity w/o anterior hip pain or snapping  Partially met   Long term goal 4: Pt will have improved HOOS by 10% or more   Met  HOOS 30% at [facility], 3/7/22: 10%      Summary of Evaluation: Assessment: Pt is a 58 yo female who presents 4 weeks postop L anterior DARNELL.   She has Hx B knee AKS and some knee pain still on L as well as continued c/o groin pain and snapping in groin/anterior hip. She demonstrates limited hip ext, tightness in anterior hip, weakness of gluts, core and hip w/ some pain and snapping too. These limitations are affecting her work as a  and usual activity. She will benefit from PT to help restore PLOF. 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 Subjective:  Pt states she thinks gel injections are helping her knees. Felt pretty good over the weekend. Is scheduled for third injections tomorrow. Any changes in Ambulatory Summary Sheet? None        Objective: COVID screening questions were asked and patient attested that there had been no contact or symptoms    Came in today ambulating with less limp. Remains tender along hip adductors         Exercises: (No more than 4 columns)   Exercise/Equipment 3/14/22 #11 3/16/22 #12 3/21/22 #13           WARM UP       [employer] Kuster 6, 5' Hold today Lev 6, 5'   Elliptical      TABLE      Manual work  See below  See below See below   KTC       Clam  RTB 15x2  RTB 15x2  RTB 15x2   abdom hold w/ add squeeze       abdom hold w/ march       Core: heel walk out       bridge   10x2  10x2 10x2   Supine hip flexor stretch            STANDING      STS  x10 Hold today 10x2   Marching for distance Small lap       3-way steam boat      Mini squats      shuttle      Side stepping w TB    RTB just above knee  25ft x 2   Step ups ant   Lat  X20 ea Hold today 10x2 fwd and lat                          PROPRIOCEPTION      SLS balance      Tandem      SLS airex 30 s x2 Hold today 30 sec x 2   airex mini squats            MODALITIES      CP                 Other Therapeutic Activities/Education:  2/8/2022: Patient received education on their current pathology and how their condition effects them with their functional activities. Patient understood discussion and questions were answered.  Patient understands their activity limitations and understands rational for treatment progression. Home Exercise Program:    Access Code: [email or web address]  URL: [email or web address]  Date: 02/08/2022  Prepared by: Bill Salcedo    Exercises  Lying Prone - 1-2 x daily - 7 x weekly - 1-2 sets - 1-5 reps - minutes hold  Bent Knee Fallouts - 1-2 x daily - 7 x weekly - 1-2 sets - 10 reps  Hook Lying Single Knee to Chest Stretch with Towel - 1-2 x daily - 7 x weekly - 1-2 sets - 10 reps - 5-10 seconds hold  Clamshell - 1-2 x daily - 7 x weekly - 1-2 sets - 10 reps  Sit to Stand - 1-2 x daily - 7 x weekly - 1-2 sets - 10 reps    2/17/22 updated HEP TB for clams, side stepping w TB, standing SLR/ abd with TB, mini squats, SLS. HO given      Manual Treatments:  AAROM/PROM hip, STM to psoas/pectineus and proximal adductor (moslty), manual stretching      Modalities:    Communication with other providers:  POC faxed 2/8/22, See note 3/7/22      Assessment:  Pt demonstrated overall good tolerance to today's session without adverse reactions. Tolerates manual well with less discomfort and tightness following. Currently reporting of no hip pain and less R/L knee pain since second gel injections. Will continue to benefit from skilled therapy interventions to address remaining impairments, improve mobility and strength and progress toward goal completion while reducing risk for re-injury or further decline.      0/10  Hip pain after tx , knees 1/10      Plan for Next Session:  continue manual work prn, Advance hip strength to grace adding core work and functional movements to prepare for jogging w/ dog training      Time In / Time Out:  7359 / 1522    Timed Code/Total Treatment Minutes:  88/95   (2) TE, (1) MT        Next Progress Note due:  See note 3/7/22      Plan of Care Interventions:  [x] Therapeutic Exercise  [x] Modalities:  [x] Therapeutic Activity     [] Ultrasound  [] Estim  [x] Gait Training      [] Cervical Traction [] Lumbar Traction  [x] Neuromuscular Re-education    [x] Cold/hotpack [] Iontophoresis   [x] Instruction in HEP      [] Vasopneumatic   [] Dry Needling    [x] Manual Therapy               [] Aquatic Therapy              Electronically signed by:  Bret Hernández PTA  3/21/2022, 3:21 PM

## 2022-03-24 ENCOUNTER — HOSPITAL ENCOUNTER (OUTPATIENT)
Dept: PHYSICAL THERAPY | Age: 62
Setting detail: THERAPIES SERIES
Discharge: HOME | End: 2022-03-24
Payer: COMMERCIAL

## 2022-03-24 PROCEDURE — 97110 THERAPEUTIC EXERCISES: CPT

## 2022-03-24 PROCEDURE — 97140 MANUAL THERAPY 1/> REGIONS: CPT

## 2022-03-24 NOTE — FLOWSHEET NOTE
Outpatient Physical Therapy  [place]           [x] Phone: [phone]   Fax: [phone]  [facility]           [] Phone: [phone]   Fax: [phone]        Physical Therapy Daily Treatment Note  Date:  3/24/2022    Patient Name:  Emry Dandy    :  1960  MRN: [id number]  Restrictions/Precautions: Other position/activity restrictions: anterior hip precautions: no extension past neutral x 6 weeks  Diagnosis:   Diagnosis: L DARNELL  Date of Injury/Surgery: 22  Treatment Diagnosis: Treatment Diagnosis: L hip pain, tissue tightness, stiffness, weakness    Insurance/Certification information: PT Insurance Information: BCBS  $25, 76 PCY   Referring Physician:  Referring Practitioner: Joshua Toussaint CNP, Surgeon Benedict Aguilar  Next Doctor Visit:    Plan of care signed (Y/N):  yes  Outcome Measure: HOOS 30%  Visit# / total visits:    New POC  prn  Pain level: 0/10 hip;   Bilat knees 1/10    Goals:     Patient goals : improve sleep, resume running for dog training, improve confidence in hip/LE  Short term goals  Time Frame for Short term goals: 3 weeks  Short term goal 1: Pt will be able to achieve neutral hip extension w/o pain or limit  Met   Short term goal 2: Pt will be able to peform hip flex/ext ROM w/o snapping in groin  Mostly met  Long term goals  Time Frame for Long term goals : 6 weeks  Long term goal 1: Pt will be independent w/ HEP and able to continue to progress on her own  Good progress   Long term goal 2: Pt will be able to begin progression back to jogging for dog training  Partially met  Long term goal 3: Pt will be able to perform her usual activity w/o anterior hip pain or snapping  Partially met   Long term goal 4: Pt will have improved HOOS by 10% or more   Met  HOOS 30% at [facility], 3/7/22: 10%      Summary of Evaluation: Assessment: Pt is a 60 yo female who presents 4 weeks postop L anterior DARNELL.   She has Hx B knee AKS and some knee pain still on L as well as continued c/o groin pain and snapping in groin/anterior hip. She demonstrates limited hip ext, tightness in anterior hip, weakness of gluts, core and hip w/ some pain and snapping too. These limitations are affecting her work as a  and usual activity. She will benefit from PT to help restore PLOF. 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 Subjective:  Hip is doing well, knees still hurting her. Denies any hip pain recently. Hip still snaps on her sometimes but no pain. Does it on leg press and heel slides still. Any changes in Ambulatory Summary Sheet? None        Objective: COVID screening questions were asked and patient attested that there had been no contact or symptoms    Came in today ambulating with less limp but still lacking some hip ext. Hip ext in S/L just to neutral.    Remains tender along L hip adductors w/ taut band noted   Still a periodic snap at hip w/ core alt knee ext.          Exercises: (No more than 4 columns)   Exercise/Equipment 3/14/22 #11 3/16/22 #12 3/21/22 #13 3/24/22  #14            WARM UP        [place] 6, 5' Hold today Lev 6, 5' -   Elliptical    -   Rec bike S14    5'          TABLE       Manual work  See below  See below See below See below    KTC        Clam  RTB 15x2  RTB 15x2  RTB 15x2 RTB 15x2   abdom hold w/ march     unsupp feet 10x ea LE, knee ext to grace    Core: heel walk out        bridge   10x2  10x2 10x2 RTB around knees 10x2   Supine hip flexor stretch    Man                  STANDING       STS  x10 Hold today 10x2 --   Marching for distance Small lap        3-way steam boat       Mini squats       Shuttle leg press     2C 10x2, light jog 1C    Side stepping w TB    RTB just above knee  25ft x 2 RTB just above knee  25ft x 2   Step ups ant   Lat  X20 ea Hold today 10x2 fwd and lat -   Quicker step up     Not ready    Prone on ball hip ext    - 10x2 ea LE, alt   Step up w/ oppos knee touch to CB     2k ball, 15x ea LE PROPRIOCEPTION              Tandem       SLS airex 30 s x2 Hold today 30 sec x 2 30\"x2   airex mini squats              MODALITIES       CP                   Other Therapeutic Activities/Education:  2/8/2022: Patient received education on their current pathology and how their condition effects them with their functional activities. Patient understood discussion and questions were answered. Patient understands their activity limitations and understands rational for treatment progression. Home Exercise Program:    Access Code: [email or web address]  URL: [email or web address]  Date: 02/08/2022  Prepared by: Katie King    Exercises  Lying Prone - 1-2 x daily - 7 x weekly - 1-2 sets - 1-5 reps - minutes hold  Bent Knee Fallouts - 1-2 x daily - 7 x weekly - 1-2 sets - 10 reps  Hook Lying Single Knee to Chest Stretch with Towel - 1-2 x daily - 7 x weekly - 1-2 sets - 10 reps - 5-10 seconds hold  Clamshell - 1-2 x daily - 7 x weekly - 1-2 sets - 10 reps  Sit to Stand - 1-2 x daily - 7 x weekly - 1-2 sets - 10 reps    2/17/22 updated HEP TB for clams, side stepping w TB, standing SLR/ abd with TB, mini squats, SLS. HO given      Manual Treatments:  AAROM/PROM hip ext in S/L, STM to adductor, manual stretching adductor w/ pin and stretch and CR, Hip flexor stretching      Modalities:    Communication with other providers:  POC faxed 2/8/22, See note 3/7/22      Assessment:  Pt demonstrated overall good tolerance to today's session without adverse reactions. Tolerates manual well with less discomfort and tightness following. She continues w/ limited hip ext ROM w/ some hip flexor tightness, altered gait and limited functional activity for work etc. Pt will continue to benefit from skilled therapy interventions to address remaining impairments, improve mobility and strength and progress toward goal completion while reducing risk for re-injury or further decline.      0/10  Hip pain after tx , knees 1/10      Plan for Next Session:  Consider DNT to adductor? ?  continue manual work prn, Advance hip strength to grace adding core work and functional movements to prepare for jogging w/ dog training      Time In / Time Out:   1500/1545    Timed Code/Total Treatment Minutes:  45/45  (2) TE, (1) MT        Next Progress Note due:  See note 3/7/22      Plan of Care Interventions:  [x] Therapeutic Exercise  [x] Modalities:  [x] Therapeutic Activity     [] Ultrasound  [] Estim  [x] Gait Training      [] Cervical Traction [] Lumbar Traction  [x] Neuromuscular Re-education    [x] Cold/hotpack [] Iontophoresis   [x] Instruction in HEP      [] Vasopneumatic   [] Dry Needling    [x] Manual Therapy               [] Aquatic Therapy              Electronically signed by:  Whitley Alicea PT, PT, MPT, ATC   3/24/2022, 8:21 AM       3/24/2022, 3:57 PM

## 2022-03-25 ENCOUNTER — OFFICE VISIT (OUTPATIENT)
Dept: FAMILY MEDICINE CLINIC | Age: 62
End: 2022-03-25
Payer: COMMERCIAL

## 2022-03-25 VITALS
HEART RATE: 67 BPM | DIASTOLIC BLOOD PRESSURE: 63 MMHG | SYSTOLIC BLOOD PRESSURE: 121 MMHG | OXYGEN SATURATION: 97 % | BODY MASS INDEX: 27.58 KG/M2 | HEIGHT: 68 IN | WEIGHT: 182 LBS | RESPIRATION RATE: 16 BRPM

## 2022-03-25 DIAGNOSIS — J30.2 SEASONAL ALLERGIES: ICD-10-CM

## 2022-03-25 DIAGNOSIS — N32.81 OVERACTIVE BLADDER: ICD-10-CM

## 2022-03-25 DIAGNOSIS — M17.9 OSTEOARTHRITIS OF KNEE, UNSPECIFIED LATERALITY, UNSPECIFIED OSTEOARTHRITIS TYPE: ICD-10-CM

## 2022-03-25 DIAGNOSIS — E53.8 VITAMIN B 12 DEFICIENCY: ICD-10-CM

## 2022-03-25 DIAGNOSIS — E05.00 GRAVES DISEASE: Primary | ICD-10-CM

## 2022-03-25 DIAGNOSIS — Z13.220 SCREENING CHOLESTEROL LEVEL: ICD-10-CM

## 2022-03-25 DIAGNOSIS — E89.0 POSTOPERATIVE HYPOTHYROIDISM: ICD-10-CM

## 2022-03-25 DIAGNOSIS — Z12.4 CERVICAL CANCER SCREENING: ICD-10-CM

## 2022-03-25 PROCEDURE — 99214 OFFICE O/P EST MOD 30 MIN: CPT | Performed by: STUDENT IN AN ORGANIZED HEALTH CARE EDUCATION/TRAINING PROGRAM

## 2022-03-25 RX ORDER — LEVOTHYROXINE SODIUM 112 UG/1
112 TABLET ORAL EVERY MORNING
Qty: 90 TABLET | Refills: 1 | Status: SHIPPED | OUTPATIENT
Start: 2022-03-25 | End: 2022-09-06

## 2022-03-25 RX ORDER — FLUTICASONE PROPIONATE 50 MCG
2 SPRAY, SUSPENSION (ML) NASAL DAILY
Qty: 3 EACH | Refills: 1 | Status: SHIPPED | OUTPATIENT
Start: 2022-03-25 | End: 2022-09-22 | Stop reason: SDUPTHER

## 2022-03-25 RX ORDER — OXYBUTYNIN CHLORIDE 15 MG/1
30 TABLET, EXTENDED RELEASE ORAL EVERY MORNING
Qty: 180 TABLET | Refills: 1 | Status: SHIPPED | OUTPATIENT
Start: 2022-03-25 | End: 2022-09-06

## 2022-03-25 ASSESSMENT — ENCOUNTER SYMPTOMS
NAUSEA: 0
SHORTNESS OF BREATH: 0
WHEEZING: 0
ABDOMINAL PAIN: 0
SORE THROAT: 0

## 2022-03-25 NOTE — PROGRESS NOTES
4/6/2022    Lawson Cowan    Chief Complaint   Patient presents with   Ardyth Moritz Other     Last routine visit,   4/30/2021.  Other     Left hip surgery in January. HPI  History was obtained from patsy. Frankie Song is a 57 y.o. female with a PMHx Graves as listed below who presents today for follow up on chronic conditions. No acute complaints. Recently had left hip surgery, currently going through PT doing well. Due for pap smear folllowwed with Dr Radha Bishop in the past however she is retired and looking for new provider. S/p thyroid ablation patient due for lab work    1. Graves disease    2. Postoperative hypothyroidism    3. Overactive bladder    4. Vitamin B 12 deficiency    5. Screening cholesterol level    6. Osteoarthritis of knee, unspecified laterality, unspecified osteoarthritis type    7. Cervical cancer screening    8. Seasonal allergies             REVIEW OF SYMPTOMS    Review of Systems   Constitutional: Negative for chills and fatigue. HENT: Negative for congestion and sore throat. Respiratory: Negative for shortness of breath and wheezing. Cardiovascular: Negative for chest pain and palpitations. Gastrointestinal: Negative for abdominal pain and nausea. Genitourinary: Negative for frequency and urgency. Neurological: Negative for light-headedness. 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     Cancer ([address]     back of arm right    Difficult intubation     Graves disease     Hypothyroidism     IUD (intrauterine device) in place 02/25/2008    Mirena    Overactive bladder     Pap smear for cervical cancer screening 06/03/2010    Neg    Pap smear for cervical cancer screening 12/02/2010    Neg    Pap smear for cervical cancer screening 12/06/2011    Neg    Vitamin B 12 deficiency        FAMILY HISTORY  Family History   Problem Relation Age of Onset    Heart Failure Mother     Cancer Maternal Aunt  Stroke Maternal Grandmother        SOCIAL HISTORY  Social History     Socioeconomic History    Marital status:      Spouse name: None    Number of children: None    Years of education: None    Highest education level: None   Occupational History    Occupation:    Tobacco Use    Smoking status: Never Smoker    Smokeless tobacco: Never Used   Vaping Use    Vaping Use: Never used   Substance and Sexual Activity    Alcohol use: Yes     Comment: moderate    Drug use: No    Sexual activity: Yes     Partners: Male   Other Topics Concern    None   Social History Narrative    None     Social Determinants of Health     Financial Resource Strain:     Difficulty of Paying Living Expenses: Not on file   Food Insecurity:     Worried About Running Out of Food in the Last Year: Not on file    Erika of Food in the Last Year: Not on file   Transportation Needs:     Lack of Transportation (Medical): Not on file    Lack of Transportation (Non-Medical):  Not on file   Physical Activity:     Days of Exercise per Week: Not on file    Minutes of Exercise per Session: Not on file   Stress:     Feeling of Stress : Not on file   Social Connections:     Frequency of Communication with Friends and Family: Not on file    Frequency of Social Gatherings with Friends and Family: Not on file    Attends Buddhist Services: Not on file    Active Member of [address] [place] [address] or Organizations: Not on file    Attends Club or Organization Meetings: Not on file    Marital Status: Not on file   Intimate Partner Violence:     Fear of Current or Ex-Partner: Not on file    Emotionally Abused: Not on file    Physically Abused: Not on file    Sexually Abused: Not on file   Housing Stability:     Unable to Pay for Housing in the Last Year: Not on file    Number of [place] in the Last Year: Not on file    Unstable Housing in the Last Year: Not on file        SURGICAL HISTORY  Past Surgical History:   Procedure Laterality Date    APPENDECTOMY  9/22/15    laprascopic    COLONOSCOPY  08/2008    Neg    COLONOSCOPY  07/03/2019    polyps divertics 5-10 year repeat    COLONOSCOPY N/A 7/3/2019    COLONOSCOPY POLYPECTOMY SNARE/COLD BIOPSY performed by Trace Garcia MD at [address]  12/2005    HGSIL    COLPOSCOPY  12/2007    HPV, poss. mild dysplasia    GYNECOLOGIC CRYOSURGERY  02/09/2006    KNEE ARTHROSCOPY Right 11/2009    Dr. Cuevas All Right     arm    WRIST GANGLION EXCISION Left                  CURRENT MEDICATIONS  Current Outpatient Medications   Medication Sig Dispense Refill    fluticasone (FLONASE) 50 MCG/ACT nasal spray 2 sprays by Each Nostril route daily 3 each 1    levothyroxine (SYNTHROID) 112 MCG tablet Take 1 tablet by mouth every morning 90 tablet 1    oxybutynin (DITROPAN XL) 15 MG extended release tablet Take 2 tablets by mouth every morning 180 tablet 1    Cyanocobalamin (B-12 PO) Take 1,500 mcg by mouth every morning        No current facility-administered medications for this visit. ALLERGIES  Allergies   Allergen Reactions    Latex Rash     tpae    Pollen Extract     Tape [Adhesive Tape] Rash       PHYSICAL EXAM    /63   Pulse 67   Resp 16   Ht 5' 8\" (1.727 m)   Wt 182 lb (82.6 kg)   SpO2 97%   BMI 27.67 kg/m²     Physical Exam  Constitutional:       Appearance: Normal appearance. HENT:      Head: Normocephalic and atraumatic. Eyes:      Extraocular Movements: Extraocular movements intact. Pupils: Pupils are equal, round, and reactive to light. Cardiovascular:      Rate and Rhythm: Normal rate and regular rhythm. Pulses: Normal pulses. Heart sounds: No murmur heard. No friction rub. No gallop. Pulmonary:      Effort: Pulmonary effort is normal.      Breath sounds: Normal breath sounds. Skin:     General: Skin is warm and dry. Neurological:      General: No focal deficit present. Mental Status: She is alert. Psychiatric:         Mood and Affect: Mood normal.         Behavior: Behavior normal.         ASSESSMENT & PLAN    1. Postoperative hypothyroidism  We will adjust medications pending labs  - levothyroxine (SYNTHROID) 112 MCG tablet; Take 1 tablet by mouth every morning  Dispense: 90 tablet; Refill: 1  - TSH with Reflex to FT4; Future    2. Overactive bladder  Stable on current regimen, continue regimen  - oxybutynin (DITROPAN XL) 15 MG extended release tablet; Take 2 tablets by mouth every morning  Dispense: 180 tablet; Refill: 1    3. Graves disease    4. Vitamin B 12 deficiency  -  - Comprehensive Metabolic Panel; Future  - Vitamin B12 & Folate; Future    5. Screening cholesterol level  F/u labs  - Comprehensive Metabolic Panel; Future  - Lipid, Fasting; Future    6. Osteoarthritis of knee, unspecified laterality, unspecified osteoarthritis type  -conservative management  - CBC with Auto Differential; Future    7. Cervical cancer screening  -patient needs to re-establish due for pap  - Neha Fisher MD, Celena Sexton    8. Seasonal allergies  - fluticasone (FLONASE) 50 MCG/ACT nasal spray; 2 sprays by Each Nostril route daily  Dispense: 3 each; Refill: 1    F/u labs  Obtain pap  O/w no changes    Return in about 6 months (around 9/25/2022).          Electronically signed by Alireza Bill DO on 4/6/2022

## 2022-03-28 ENCOUNTER — HOSPITAL ENCOUNTER (OUTPATIENT)
Dept: PHYSICAL THERAPY | Age: 62
Setting detail: THERAPIES SERIES
Discharge: HOME | End: 2022-03-28
Payer: COMMERCIAL

## 2022-03-28 PROCEDURE — 97110 THERAPEUTIC EXERCISES: CPT

## 2022-03-28 PROCEDURE — 97140 MANUAL THERAPY 1/> REGIONS: CPT

## 2022-03-28 NOTE — FLOWSHEET NOTE
Outpatient Physical Therapy  [facility]           [x] Phone: [phone]   Fax: [phone]  [facility]           [] Phone: [phone]   Fax: [phone]        Physical Therapy Daily Treatment Note  Date:  3/28/2022    Patient Name:  Deonna Whalen    :  1960  MRN: [id number]  Restrictions/Precautions: Other position/activity restrictions: anterior hip precautions: no extension past neutral x 6 weeks  Diagnosis:   Diagnosis: L DARNELL  Date of Injury/Surgery: 22  Treatment Diagnosis: Treatment Diagnosis: L hip pain, tissue tightness, stiffness, weakness    Insurance/Certification information: PT Insurance Information: BCBS  $25, 76 PCY   Referring Physician:  Referring Practitioner: Kayleen Benoit CNP, Surgeon Katey Santoyo  Next Doctor Visit:    Plan of care signed (Y/N):  yes  Outcome Measure: HOOS 30%  Visit# / total visits:   15/20 New POC  prn  Pain level: 0/10 hip;   Bilat knees 3-4/10    Goals:     Patient goals : improve sleep, resume running for dog training, improve confidence in hip/LE  Short term goals  Time Frame for Short term goals: 3 weeks  Short term goal 1: Pt will be able to achieve neutral hip extension w/o pain or limit  Met   Short term goal 2: Pt will be able to peform hip flex/ext ROM w/o snapping in groin  Mostly met  Long term goals  Time Frame for Long term goals : 6 weeks  Long term goal 1: Pt will be independent w/ HEP and able to continue to progress on her own  Good progress   Long term goal 2: Pt will be able to begin progression back to jogging for dog training  Partially met  Long term goal 3: Pt will be able to perform her usual activity w/o anterior hip pain or snapping  Partially met   Long term goal 4: Pt will have improved HOOS by 10% or more   Met  HOOS 30% at [facility], 3/7/22: 10%      Summary of Evaluation: Assessment: Pt is a 58 yo female who presents 4 weeks postop L anterior DARNELL.   She has Hx B knee AKS and some knee pain still on L as well as continued c/o groin pain and snapping in groin/anterior hip. She demonstrates limited hip ext, tightness in anterior hip, weakness of gluts, core and hip w/ some pain and snapping too. These limitations are affecting her work as a  and usual activity. She will benefit from PT to help restore PLOF. 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 Subjective:  Feeling pretty good today, some pain lateral knees. Thinks new exercises were ok, no issue in hip but not sure about knees. Any changes in Ambulatory Summary Sheet? None        Objective: COVID screening questions were asked and patient attested that there had been no contact or symptoms    Ambulating with less limp but still lacking some hip ext. Hip ext in S/L just to neutral.    Remains tender along L hip adductors w/ taut band noted, mild at iliopsoas today as well   Still a periodic snap at hip w/ core alt knee ext.          Exercises: (No more than 4 columns)   Exercise/Equipment 3/21/22 #13 3/24/22  #14 3/28/22  #15             WARM UP        [place] 6, 5' - -    Elliptical  - -    Rec bike S14  5' Lev 2, 5'           TABLE       Manual work  See below See below  See below    KTC        Clam  RTB 15x2 RTB 15x2 RTB 15x2    abdom hold w/ march   unsupp feet 10x ea LE, knee ext to grace  unsupp feet 10x ea LE, knee ext to grace     Core: heel walk out        bridge   10x2 RTB around knees 10x2 RTB around knees 10x2    Supine hip flexor stretch  Man  Man in S/L                  STANDING       STS  10x2 -- -    Marching for distance   -    3-way steam boat   -    Mini squats   -    Shuttle leg press   2C 10x2, light jog 1C 30\" 2C 10x2, light jog 1C 30\"    Side stepping w TB  RTB just above knee  25ft x 2 RTB just above knee  25ft x 2 RTB just above knee  25ft x 2    Step ups ant   Lat  10x2 fwd and lat - -    Quicker step up   Not ready  -    Prone on ball hip ext  - 10x2 ea LE, alt 10x2 ea LE, alt    Step up w/ oppos knee touch to CB   2k ball, 15x ea LE  2k ball, 10x2 ea LE    Step up w/ [place] ball to work on hip ext/hip flexor stretch   -- 2k ball, 10x2 ea LE                       PROPRIOCEPTION              Tandem       SLS airex 30 sec x 2 30\"x2 30\" x2    airex mini squats              MODALITIES       CP    10'               Other Therapeutic Activities/Education:  2/8/2022: Patient received education on their current pathology and how their condition effects them with their functional activities. Patient understood discussion and questions were answered. Patient understands their activity limitations and understands rational for treatment progression. Home Exercise Program:    Access Code: [email or web address]  URL: [email or web address]/  Date: 02/08/2022  Prepared by: Damaris Hannah    Exercises  Lying Prone - 1-2 x daily - 7 x weekly - 1-2 sets - 1-5 reps - minutes hold  Bent Knee Fallouts - 1-2 x daily - 7 x weekly - 1-2 sets - 10 reps  Hook Lying Single Knee to Chest Stretch with Towel - 1-2 x daily - 7 x weekly - 1-2 sets - 10 reps - 5-10 seconds hold  Clamshell - 1-2 x daily - 7 x weekly - 1-2 sets - 10 reps  Sit to Stand - 1-2 x daily - 7 x weekly - 1-2 sets - 10 reps    2/17/22 updated HEP TB for clams, side stepping w TB, standing SLR/ abd with TB, mini squats, SLS. HO given      Manual Treatments:  AAROM/PROM hip ext in S/L, STM to adductor, manual stretching adductor w/ CR, Hip flexor stretching      Modalities:  CP to L hip, pt prone x 10'  Communication with other providers:  POC faxed 2/8/22, See note 3/7/22      Assessment:  Pt demonstrated good tolerance to today's session without adverse reactions. She tolerates manual well with less discomfort and tightness following.  She continues w/ limited hip ext ROM w/ some hip flexor tightness, altered gait and limited functional activity for work etc. Pt will continue to benefit from skilled therapy interventions to address remaining impairments, improve mobility and strength and progress toward goal completion while reducing risk for re-injury or further decline. 0/10  Hip pain after tx , knees 3/10      Plan for Next Session:  Consider DNT to adductor? ?--she is scheduled to see Jo 4/11  continue manual work prn, Advance hip strength to grace adding core work and functional movements to prepare for jogging w/ dog training      Time In / Time Out:    1450/1545    Timed Code/Total Treatment Minutes:  45/45   (2) TE, (1) MT   CP not billed      Next Progress Note due:  See note 3/7/22, ST do on 4/9      Plan of Care Interventions:  [x] Therapeutic Exercise  [x] Modalities:  [x] Therapeutic Activity     [] Ultrasound  [] Estim  [x] Gait Training      [] Cervical Traction [] Lumbar Traction  [x] Neuromuscular Re-education    [x] Cold/hotpack [] Iontophoresis   [x] Instruction in HEP      [] Vasopneumatic   [] Dry Needling    [x] Manual Therapy               [] Aquatic Therapy              Electronically signed by:  Justin Vanegas PT, PT, MPT, ATC   3/28/2022, 9:25 AM        3/28/2022, 4:00 PM

## 2022-03-30 ENCOUNTER — HOSPITAL ENCOUNTER (OUTPATIENT)
Dept: PHYSICAL THERAPY | Age: 62
Setting detail: THERAPIES SERIES
Discharge: HOME | End: 2022-03-30
Payer: COMMERCIAL

## 2022-03-30 PROCEDURE — 97140 MANUAL THERAPY 1/> REGIONS: CPT

## 2022-03-30 PROCEDURE — 97110 THERAPEUTIC EXERCISES: CPT

## 2022-03-30 NOTE — FLOWSHEET NOTE
Outpatient Physical Therapy  [place]           [x] Phone: [phone]   Fax: [phone]  [facility]           [] Phone: [phone]   Fax: [phone]        Physical Therapy Daily Treatment Note  Date:  3/30/2022    Patient Name:  Julia Long    :  1960  MRN: [id number]  Restrictions/Precautions: Other position/activity restrictions: anterior hip precautions: no extension past neutral x 6 weeks  Diagnosis:   Diagnosis: L DARNELL  Date of Injury/Surgery: 22  Treatment Diagnosis: Treatment Diagnosis: L hip pain, tissue tightness, stiffness, weakness    Insurance/Certification information: PT Insurance Information: BCBS  $25, 76 PCY   Referring Physician:  Referring Practitioner: Kait Agarwal CNP, Surgeon Ron Christensen  Next Doctor Visit:    Plan of care signed (Y/N):  yes  Outcome Measure: HOOS 30%  Visit# / total visits:    New POC  prn  Pain level: 0/10 hip;   Bilat knees 3-4/10    Goals:     Patient goals : improve sleep, resume running for dog training, improve confidence in hip/LE  Short term goals  Time Frame for Short term goals: 3 weeks  Short term goal 1: Pt will be able to achieve neutral hip extension w/o pain or limit  Met   Short term goal 2: Pt will be able to peform hip flex/ext ROM w/o snapping in groin  Mostly met  Long term goals  Time Frame for Long term goals : 6 weeks  Long term goal 1: Pt will be independent w/ HEP and able to continue to progress on her own  Good progress   Long term goal 2: Pt will be able to begin progression back to jogging for dog training  Partially met  Long term goal 3: Pt will be able to perform her usual activity w/o anterior hip pain or snapping  Partially met   Long term goal 4: Pt will have improved HOOS by 10% or more   Met  HOOS 30% at [facility], 3/7/22: 10%      Summary of Evaluation: Assessment: Pt is a 58 yo female who presents 4 weeks postop L anterior DARNELL.   She has Hx B knee AKS and some knee pain still on L as well as continued c/o groin pain and snapping in groin/anterior hip. She demonstrates limited hip ext, tightness in anterior hip, weakness of gluts, core and hip w/ some pain and snapping too. These limitations are affecting her work as a  and usual activity. She will benefit from PT to help restore PLOF. 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 Subjective:  Pt states her hip is doing well. Continues to have bilat knee pain. Did bring knee braces with her today to see if knees would handle exercises better wearing them. Any changes in Ambulatory Summary Sheet? None        Objective: COVID screening questions were asked and patient attested that there had been no contact or symptoms    Pt wearing bilat [employer] knee braces with exercises today. Hip ext in S/L just to neutral.    Remains tender along L hip adductors. No snap noted at hip w/ core alt knee ext today.          Exercises: (No more than 4 columns)   Exercise/Equipment 3/21/22 #13 3/24/22  #14 3/28/22  #15 3/30/22 #16            WARM UP        Damaris Abo  Lev 6, 5' - -    Elliptical  - -    Rec bike S14  5' Lev 2, 5' Lev 2, 5'          TABLE       Manual work  See below See below  See below See below   KTC        Clam  RTB 15x2 RTB 15x2 RTB 15x2 RTB 15x2   abdom hold w/ march   unsupp feet 10x ea LE, knee ext to grace  unsupp feet 10x ea LE, knee ext to grace  unsupp feet 10x ea LE, knee ext to grace    Core: heel walk out        bridge   10x2 RTB around knees 10x2 RTB around knees 10x2 RTB around knees 10x2   Supine hip flexor stretch  Man  Man in S/L Man in S/L 30\"x2                 STANDING       STS  10x2 -- -    Marching for distance   -    3-way steam boat   -    Mini squats   -    Shuttle leg press   2C 10x2, light jog 1C 30\" 2C 10x2, light jog 1C 30\" 2C 10x2, light jog 1C 30\"   Side stepping w TB  RTB just above knee  25ft x 2 RTB just above knee  25ft x 2 RTB just above knee  25ft x 2 RTB just above knee  25ft x 2   Step ups ant Lat  10x2 fwd and lat - -    Quicker step up   Not ready  -    Prone on ball hip ext  - 10x2 ea LE, alt 10x2 ea LE, alt 10x2 ea LE, alt   Step up w/ oppos knee touch to CB   2k ball, 15x ea LE  2k ball, 10x2 ea LE 2k ball, 10x2 ea LE   Step up w/ OH ball to work on hip ext/hip flexor stretch   -- 2k ball, 10x2 ea LE 2k ball, 10x2 ea LE                      PROPRIOCEPTION              Tandem       SLS airex 30 sec x 2 30\"x2 30\" x2 30\" x2   airex mini squats              MODALITIES       CP    10'               Other Therapeutic Activities/Education:  2/8/2022: Patient received education on their current pathology and how their condition effects them with their functional activities. Patient understood discussion and questions were answered. Patient understands their activity limitations and understands rational for treatment progression. Home Exercise Program:    Access Code: [email or web address]  URL: [email or web address]  Date: 02/08/2022  Prepared by: Lauryn Ang    Exercises  Lying Prone - 1-2 x daily - 7 x weekly - 1-2 sets - 1-5 reps - minutes hold  Bent Knee Fallouts - 1-2 x daily - 7 x weekly - 1-2 sets - 10 reps  Hook Lying Single Knee to Chest Stretch with Towel - 1-2 x daily - 7 x weekly - 1-2 sets - 10 reps - 5-10 seconds hold  Clamshell - 1-2 x daily - 7 x weekly - 1-2 sets - 10 reps  Sit to Stand - 1-2 x daily - 7 x weekly - 1-2 sets - 10 reps    2/17/22 updated HEP TB for clams, side stepping w TB, standing SLR/ abd with TB, mini squats, SLS. HO given      Manual Treatments:  AAROM/PROM hip ext in S/L, STM to adductor, manual stretching adductor w/ CR, Hip flexor stretching      Modalities:    Communication with other providers:  POC faxed 2/8/22, See note 3/7/22      Assessment:  Pt demonstrated good tolerance to today's session without adverse reactions. Tolerates manual well with less discomfort and tightness following. Continues to demo limited hip ext ROM.  Will monitor knees this afternoon to see if wearing braces improved tolerance. Pt will continue to benefit from skilled therapy interventions to address remaining impairments, improve mobility and strength and progress toward goal completion while reducing risk for re-injury or further decline.      End pain 0/10  Hip pain, knees 3/10      Plan for Next Session:  Cande---Add DNT to POC--she is scheduled to see Carri Ghosh 4/11  continue manual work prn, Advance hip strength to grace adding core work and functional movements to prepare for jogging w/ dog training      Time In / Time Out:    1125/1213 - arrived 10 min late    Timed Code/Total Treatment Minutes:  48/48,   (2) TE, (1) MT         Next Progress Note due:  See note 3/7/22, ST do on 4/9      Plan of Care Interventions:  [x] Therapeutic Exercise  [x] Modalities:  [x] Therapeutic Activity     [] Ultrasound  [] Estim  [x] Gait Training      [] Cervical Traction [] Lumbar Traction  [x] Neuromuscular Re-education    [x] Cold/hotpack [] Iontophoresis   [x] Instruction in HEP      [] Vasopneumatic   [] Dry Needling    [x] Manual Therapy               [] Aquatic Therapy              Electronically signed by:  Chantell Loza PTA,    3/30/2022, 11:16 AM        3/30/2022, 11:16 AM

## 2022-04-09 ENCOUNTER — HOSPITAL ENCOUNTER (OUTPATIENT)
Dept: PHYSICAL THERAPY | Age: 62
Setting detail: THERAPIES SERIES
Discharge: HOME | End: 2022-04-09
Payer: COMMERCIAL

## 2022-04-09 PROCEDURE — 97110 THERAPEUTIC EXERCISES: CPT

## 2022-04-09 PROCEDURE — 97140 MANUAL THERAPY 1/> REGIONS: CPT

## 2022-04-09 NOTE — DISCHARGE SUMMARY
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 Has been performing most activity again and even able to jog at dog agility w/o hip pain. No hip pain to report lately, knees hurt though. Plan of Care/Treatment to date:  [x] Therapeutic Exercise    [x] Modalities:  [x] Therapeutic Activity     [] Ultrasound  [] Electrical Stimulation  [x] Gait Training      [] Cervical Traction   [] Lumbar Traction  [x] Neuromuscular Re-education  [x] Cold/hotpack [] Iontophoresis  [x] Instruction in HEP      Other:  [x] Manual Therapy       []  Vasopneumatic  [] Aquatic Therapy       [x]   Dry Needle Therapy                      Objective/Significant Findings At Last Visit/Comments:  COVID screening questions were asked and patient attested that there had been no contact or symptoms  Pt wearing bilat DonJoy knee braces with exercises today. AROM L hip flex supine 114°, hip ext standing 12°  MMT knee WNL, hip grossly WNL   Ambulates w/o device w/ mild antagic gait d/t knee pain  No popping or clicking in hip today with modified exercises   Pt has good tech w/ exercises.    Now able to sleep supine in bed without pillow under knees  She has mild TTP w/ tightness in adductor on L and min at iliopsoas now. Assessment:  Pt demonstrated good tolerance to today's session without adverse reactions.  Pt has made great progress overall and met her goals so we will dc at this time. Goal Status:  [x] Achieved [] Partially Achieved  [] Not Achieved   Patient goals : improve sleep, resume running for dog training, improve confidence in hip/LE  Met  Short term goals  Time Frame for Short term goals: 3 weeks  Short term goal 1: Pt will be able to achieve neutral hip extension w/o pain or limit  Met   Short term goal 2: Pt will be able to peform hip flex/ext ROM w/o snapping in groin  Mostly met, but no pain   Long term goals  Time Frame for Long term goals : 6 weeks  Long term goal 1: Pt will be independent w/ HEP and able to continue to progress on her own  Met   Long term goal 2: Pt will be able to begin progression back to jogging for dog training    Met   Long term goal 3: Pt will be able to perform her usual activity w/o anterior hip pain or snapping  Mostly met   Long term goal 4: Pt will have improved HOOS by 10% or more   Met  HOOS 30% at [facility], 3/7/22: 10%, 4/9/22:  5%     Frequency/Duration:  # Days per week: [] 1 day # Weeks: [] 1 week [] 4 weeks [x] 8 weeks     [x] 2 days   [] 2 weeks [] 5 weeks [] 10 weeks     [] 3 days   [] 3 weeks [] 6 weeks [] 12 weeks       Rehab Potential: [] Excellent [x] Good [] Fair  [] Poor         Patient Status: [] Continue per initial plan of Care     [x] Patient now discharged     [] Additional visits requested, Please re-certify for additional visits:      Requested frequency/duration:    If we are requesting more visits, we fully anticipate the patient's condition is expected to improve within the treatment timeframe we are requesting. Electronically signed by:  [place], PT, PT, MPT, ATC  4/9/2022, 8:02 AM    4/9/2022, 12:29 PM    If you have any questions or concerns, please don't hesitate to call.   Thank you for your referral.

## 2022-04-09 NOTE — FLOWSHEET NOTE
Outpatient Physical Therapy  [place]           [x] Phone: [phone]   Fax: [phone]  [place]           [] Phone: [phone]   Fax: [phone]        Physical Therapy Daily Treatment Note  Date:  2022    Patient Name:  Chalino Duque    :  1960  MRN: [id number]  Restrictions/Precautions: Other position/activity restrictions: anterior hip precautions: no extension past neutral x 6 weeks  Diagnosis:   Diagnosis: L DARNELL  Date of Injury/Surgery: 22  Treatment Diagnosis: Treatment Diagnosis: L hip pain, tissue tightness, stiffness, weakness    Insurance/Certification information: PT Insurance Information: BCBS  $25, 76 PCY   Referring Physician:  Referring Practitioner: Clay Trent CNP, Surgeon Dari Hannah  Next Doctor Visit:    Plan of care signed (Y/N):  yes  Outcome Measure: HOOS 30%  Visit# / total visits:    New POC  prn  Pain level: 0/10 hip;   Bilat knees 2-3/10    Goals:     Patient goals : improve sleep, resume running for dog training, improve confidence in hip/LE  Met  Short term goals  Time Frame for Short term goals: 3 weeks  Short term goal 1: Pt will be able to achieve neutral hip extension w/o pain or limit  Met   Short term goal 2: Pt will be able to peform hip flex/ext ROM w/o snapping in groin  Mostly met, but no pain   Long term goals  Time Frame for Long term goals : 6 weeks  Long term goal 1: Pt will be independent w/ HEP and able to continue to progress on her own  Met   Long term goal 2: Pt will be able to begin progression back to jogging for dog training    Met   Long term goal 3: Pt will be able to perform her usual activity w/o anterior hip pain or snapping  Mostly met   Long term goal 4: Pt will have improved HOOS by 10% or more   Met  HOOS 30% at eval, 3/7/22: 10%, 22:  5%      Summary of Evaluation: Assessment: Pt is a 58 yo female who presents 4 weeks postop L anterior DARNELL.   She has Hx B knee AKS and some knee pain still on L as well as continued c/o groin pain and snapping in groin/anterior hip. She demonstrates limited hip ext, tightness in anterior hip, weakness of gluts, core and hip w/ some pain and snapping too. These limitations are affecting her work as a  and usual activity. She will benefit from PT to help restore PLOF. 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 Subjective:       Thurs night at [employer] was moving freely and has been able to sleep in bed w/o issue. Has been performing most activity again and even able to jog at dog agility w/o hip pain. Any changes in Ambulatory Summary Sheet? None        Objective: COVID screening questions were asked and patient attested that there had been no contact or symptoms    Pt wearing [employer] knee braces with exercises today. AROM L hip flex supine 114°, hip ext standing 12°  MMT knee WNL, hip grossly WNL   Ambulates w/o device w/ mild antagic gait d/t knee pain  No popping or clicking in hip today with modified exercises   Pt has good tech w/ exercises.    Now able to sleep supine in bed without pillow under knees  She has mild TTP w/ tightness in adductor on L and min at iliopsoas now.          Exercises: (No more than 4 columns)   Exercise/Equipment 3/28/22  #15 3/30/22 #16 4/9/22  #17             WARM UP        Nustep  -      Elliptical -      Rec bike S14 re3D Player 2, 5' Lev 2, 5' Lev 3, 5'           TABLE       Manual work  See below See below See below     KTC        Clam  RTB 15x2 RTB 15x2 RTB 15x2     abdom hold w/ march  unsupp feet 10x ea LE, knee ext to grace  unsupp feet 10x ea LE, knee ext to grace  unsupp feet 10x ea LE, knee ext to grace    Core: heel walk out        bridge   RTB around knees 10x2 RTB around knees 10x2 -    Supine hip flexor stretch Man in S/L Man in S/L 30\"x2 Man, groin too                   STANDING       STS  -      Marching for distance -      3-way steam boat -      Mini squats -      Shuttle leg press  2C 10x2, light jog 1C 30\" 2C 10x2, light jog 1C 30\" mech check     Side stepping w TB  RTB just above knee  25ft x 2 RTB just above knee  25ft x 2 RTB just above knee  25ft x 2    Step ups ant   Lat  -      Quicker step up  -      Prone on ball hip ext  10x2 ea LE, alt 10x2 ea LE, alt     Step up w/ oppos knee touch to CB  2k ball, 10x2 ea LE 2k ball, 10x2 ea LE --    Step up w/ OH ball to work on hip ext/hip flexor stretch  2k ball, 10x2 ea LE 2k ball, 10x2 ea LE                        PROPRIOCEPTION              Tandem       SLS airex 30\" x2 30\" x2     airex mini squats              MODALITIES       CP  10'                 Other Therapeutic Activities/Education:  2/8/2022: Patient received education on their current pathology and how their condition effects them with their functional activities. Patient understood discussion and questions were answered. Patient understands their activity limitations and understands rational for treatment progression. Home Exercise Program:    Access Code: [email or web address]  URL: [email or web address]/  Date: 02/08/2022  Prepared by: Katina Matamoros    Exercises  Lying Prone - 1-2 x daily - 7 x weekly - 1-2 sets - 1-5 reps - minutes hold  Bent Knee Fallouts - 1-2 x daily - 7 x weekly - 1-2 sets - 10 reps  Hook Lying Single Knee to Chest Stretch with Towel - 1-2 x daily - 7 x weekly - 1-2 sets - 10 reps - 5-10 seconds hold  Clamshell - 1-2 x daily - 7 x weekly - 1-2 sets - 10 reps  Sit to Stand - 1-2 x daily - 7 x weekly - 1-2 sets - 10 reps    2/17/22 updated HEP TB for clams, side stepping w TB, standing SLR/ abd with TB, mini squats, SLS. HO given  Access Code: [email or web address]  URL: [employer]/  Date: 04/09/2022  Prepared by: Katina Matamoros    Exercises  Butterfly Groin Stretch - 1 x daily - 7 x weekly - 1 sets - 2-3 reps - 30 seconds hold      Manual Treatments:    STM to adductor, manual stretching adductor w/ CR, Hip flexor stretching      Modalities: Communication with other providers:  POC faxed 2/8/22, See note 3/7/22, 4/9/22      Assessment:  Pt demonstrated good tolerance to today's session without adverse reactions. Pt has made great progress overall and met her goals so we will dc at this time.         Plan for Next Session:   dc      Time In / Time Out:  1035/1115       Timed Code/Total Treatment Minutes:  40/40  (2) TE (1) man       Next Progress Note due:  See note 3/7/22, 4/9/22      Plan of Care Interventions:  [x] Therapeutic Exercise  [x] Modalities:  [x] Therapeutic Activity     [] Ultrasound  [] Estim  [x] Gait Training      [] Cervical Traction [] Lumbar Traction  [x] Neuromuscular Re-education    [x] Cold/hotpack [] Iontophoresis   [x] Instruction in HEP      [] Vasopneumatic   [] Dry Needling    [x] Manual Therapy               [] Aquatic Therapy              Electronically signed by:  [place] PT, PT, MPT, ATC  4/9/2022, 8:01 AM        4/9/2022, 12:27 PM

## 2022-04-16 ENCOUNTER — HOSPITAL ENCOUNTER (OUTPATIENT)
Age: 62
Discharge: HOME | End: 2022-04-16
Payer: COMMERCIAL

## 2022-04-16 DIAGNOSIS — M17.9 OSTEOARTHRITIS OF KNEE, UNSPECIFIED LATERALITY, UNSPECIFIED OSTEOARTHRITIS TYPE: ICD-10-CM

## 2022-04-16 DIAGNOSIS — E89.0 POSTOPERATIVE HYPOTHYROIDISM: ICD-10-CM

## 2022-04-16 DIAGNOSIS — Z13.220 SCREENING CHOLESTEROL LEVEL: ICD-10-CM

## 2022-04-16 DIAGNOSIS — E53.8 VITAMIN B 12 DEFICIENCY: ICD-10-CM

## 2022-04-16 LAB
ALBUMIN SERPL-MCNC: 4.3 GM/DL (ref 3.4–5)
ALP BLD-CCNC: 88 IU/L (ref 40–129)
ALT SERPL-CCNC: 12 U/L (ref 10–40)
ANION GAP SERPL CALCULATED.3IONS-SCNC: 11 MMOL/L (ref 4–16)
AST SERPL-CCNC: 19 IU/L (ref 15–37)
BASOPHILS ABSOLUTE: 0 K/CU MM
BASOPHILS RELATIVE PERCENT: 0.8 % (ref 0–1)
BILIRUB SERPL-MCNC: 0.6 MG/DL (ref 0–1)
BUN BLDV-MCNC: 20 MG/DL (ref 6–23)
CALCIUM SERPL-MCNC: 9.2 MG/DL (ref 8.3–10.6)
CHLORIDE BLD-SCNC: 104 MMOL/L (ref 99–110)
CHOLESTEROL, FASTING: 196 MG/DL
CO2: 26 MMOL/L (ref 21–32)
CREAT SERPL-MCNC: 0.6 MG/DL (ref 0.6–1.1)
DIFFERENTIAL TYPE: ABNORMAL
EOSINOPHILS ABSOLUTE: 0.1 K/CU MM
EOSINOPHILS RELATIVE PERCENT: 2 % (ref 0–3)
GFR AFRICAN AMERICAN: >60 ML/MIN/1.73M2
GFR NON-AFRICAN AMERICAN: >60 ML/MIN/1.73M2
GLUCOSE BLD-MCNC: 93 MG/DL (ref 70–99)
HCT VFR BLD CALC: 44.1 % (ref 37–47)
HDLC SERPL-MCNC: 82 MG/DL
HEMOGLOBIN: 14 GM/DL (ref 12.5–16)
IMMATURE NEUTROPHIL %: 0 % (ref 0–0.43)
LDL CHOLESTEROL CALCULATED: 102 MG/DL
LYMPHOCYTES ABSOLUTE: 1.9 K/CU MM
LYMPHOCYTES RELATIVE PERCENT: 48 % (ref 24–44)
MCH RBC QN AUTO: 30.7 PG (ref 27–31)
MCHC RBC AUTO-ENTMCNC: 31.7 % (ref 32–36)
MCV RBC AUTO: 96.7 FL (ref 78–100)
MONOCYTES ABSOLUTE: 0.4 K/CU MM
MONOCYTES RELATIVE PERCENT: 10.2 % (ref 0–4)
NUCLEATED RBC %: 0 %
PDW BLD-RTO: 11.9 % (ref 11.7–14.9)
PLATELET # BLD: 256 K/CU MM (ref 140–440)
PMV BLD AUTO: 9.9 FL (ref 7.5–11.1)
POTASSIUM SERPL-SCNC: 4.4 MMOL/L (ref 3.5–5.1)
RBC # BLD: 4.56 M/CU MM (ref 4.2–5.4)
SEGMENTED NEUTROPHILS ABSOLUTE COUNT: 1.5 K/CU MM
SEGMENTED NEUTROPHILS RELATIVE PERCENT: 39 % (ref 36–66)
SODIUM BLD-SCNC: 141 MMOL/L (ref 135–145)
TOTAL IMMATURE NEUTOROPHIL: 0 K/CU MM
TOTAL NUCLEATED RBC: 0 K/CU MM
TOTAL PROTEIN: 7.1 GM/DL (ref 6.4–8.2)
TRIGLYCERIDE, FASTING: 62 MG/DL
WBC # BLD: 3.9 K/CU MM (ref 4–10.5)

## 2022-04-16 PROCEDURE — 82746 ASSAY OF FOLIC ACID SERUM: CPT

## 2022-04-16 PROCEDURE — 36415 COLL VENOUS BLD VENIPUNCTURE: CPT

## 2022-04-16 PROCEDURE — 85025 COMPLETE CBC W/AUTO DIFF WBC: CPT

## 2022-04-16 PROCEDURE — 84439 ASSAY OF FREE THYROXINE: CPT

## 2022-04-16 PROCEDURE — 84443 ASSAY THYROID STIM HORMONE: CPT

## 2022-04-16 PROCEDURE — 80053 COMPREHEN METABOLIC PANEL: CPT

## 2022-04-16 PROCEDURE — 82607 VITAMIN B-12: CPT

## 2022-04-16 PROCEDURE — 80061 LIPID PANEL: CPT

## 2022-04-18 LAB
T4 FREE: 1.75 NG/DL (ref 0.9–1.8)
TSH HIGH SENSITIVITY: 0.2 UIU/ML (ref 0.27–4.2)

## 2022-04-23 LAB
FOLATE: 17 NG/ML (ref 3.1–17.5)
VITAMIN B-12: >2000 PG/ML (ref 211–911)

## 2022-04-25 ENCOUNTER — HOSPITAL ENCOUNTER (OUTPATIENT)
Age: 62
Setting detail: SPECIMEN
Discharge: HOME | End: 2022-04-25
Payer: COMMERCIAL

## 2022-04-25 ENCOUNTER — INITIAL CONSULT (OUTPATIENT)
Dept: OBGYN | Age: 62
End: 2022-04-25
Payer: COMMERCIAL

## 2022-04-25 VITALS
DIASTOLIC BLOOD PRESSURE: 82 MMHG | WEIGHT: 184 LBS | HEART RATE: 64 BPM | SYSTOLIC BLOOD PRESSURE: 143 MMHG | HEIGHT: 68 IN | BODY MASS INDEX: 27.89 KG/M2

## 2022-04-25 DIAGNOSIS — Z01.419 WOMEN'S ANNUAL ROUTINE GYNECOLOGICAL EXAMINATION: Primary | ICD-10-CM

## 2022-04-25 DIAGNOSIS — R10.2 PELVIC PAIN: ICD-10-CM

## 2022-04-25 DIAGNOSIS — Z96.642 HISTORY OF TOTAL LEFT HIP REPLACEMENT: ICD-10-CM

## 2022-04-25 DIAGNOSIS — Z78.0 POST-MENOPAUSAL: ICD-10-CM

## 2022-04-25 DIAGNOSIS — Z12.31 BREAST CANCER SCREENING BY MAMMOGRAM: ICD-10-CM

## 2022-04-25 LAB — PAP SMEAR, EXTERNAL: NORMAL

## 2022-04-25 PROCEDURE — 99386 PREV VISIT NEW AGE 40-64: CPT

## 2022-04-25 PROCEDURE — 87624 HPV HI-RISK TYP POOLED RSLT: CPT

## 2022-04-25 PROCEDURE — 87801 DETECT AGNT MULT DNA AMPLI: CPT

## 2022-04-25 PROCEDURE — 88142 CYTOPATH C/V THIN LAYER: CPT

## 2022-04-25 SDOH — ECONOMIC STABILITY: FOOD INSECURITY: WITHIN THE PAST 12 MONTHS, THE FOOD YOU BOUGHT JUST DIDN'T LAST AND YOU DIDN'T HAVE MONEY TO GET MORE.: NEVER TRUE

## 2022-04-25 SDOH — ECONOMIC STABILITY: HOUSING INSECURITY
IN THE LAST 12 MONTHS, WAS THERE A TIME WHEN YOU DID NOT HAVE A STEADY PLACE TO SLEEP OR SLEPT IN A SHELTER (INCLUDING NOW)?: NO

## 2022-04-25 SDOH — ECONOMIC STABILITY: TRANSPORTATION INSECURITY
IN THE PAST 12 MONTHS, HAS THE LACK OF TRANSPORTATION KEPT YOU FROM MEDICAL APPOINTMENTS OR FROM GETTING MEDICATIONS?: NO

## 2022-04-25 SDOH — ECONOMIC STABILITY: TRANSPORTATION INSECURITY
IN THE PAST 12 MONTHS, HAS LACK OF TRANSPORTATION KEPT YOU FROM MEETINGS, WORK, OR FROM GETTING THINGS NEEDED FOR DAILY LIVING?: NO

## 2022-04-25 SDOH — ECONOMIC STABILITY: FOOD INSECURITY: WITHIN THE PAST 12 MONTHS, YOU WORRIED THAT YOUR FOOD WOULD RUN OUT BEFORE YOU GOT MONEY TO BUY MORE.: NEVER TRUE

## 2022-04-25 SDOH — ECONOMIC STABILITY: INCOME INSECURITY: IN THE LAST 12 MONTHS, WAS THERE A TIME WHEN YOU WERE NOT ABLE TO PAY THE MORTGAGE OR RENT ON TIME?: NO

## 2022-04-25 ASSESSMENT — ENCOUNTER SYMPTOMS
ABDOMINAL PAIN: 0
GASTROINTESTINAL NEGATIVE: 1
RESPIRATORY NEGATIVE: 1

## 2022-04-25 ASSESSMENT — SOCIAL DETERMINANTS OF HEALTH (SDOH): HOW HARD IS IT FOR YOU TO PAY FOR THE VERY BASICS LIKE FOOD, HOUSING, MEDICAL CARE, AND HEATING?: NOT HARD AT ALL

## 2022-04-25 NOTE — PROGRESS NOTES
4/25/22    Uday Lay  1960    Chief Complaint   Patient presents with    New Patient     postmenopausal, is sexually active, Last pap smear 2019 normal, Last mammo was 2021 normal, never had dexa, last colonoscopy was 2021 normal     Pelvic Pain     Pt c/o pelvic pain that moves around and comes and goes, pt unsure where pain is coming from. if its related to the total hip replacement or not? The patient is a 57 y.o. female, Kathleen Williamson who presents for her annual exam.  She is menopausal.  She is not taking HRT. [place] She is  sexually active. She reports additional symptoms of pelvic pain and vaginal dryness. .      Pt reports intermittent pelvic pain since February 2022, describes it as a dull ache that is never in one specific spot, seems to migrate. Pt does reports L total hip replacement in January 2022, states she first noticed the pain while in PT for that surgery. Denies abnormal bleeding, urinary symptoms, vaginal discharge. Pt also reports some vaginal dryness, has questions about Camila  therapy. Pap smear history: Her last PAP smear was in 2019. Her results were normal.    Breast history: her most recent mammogram was in 2021. The results were: Normal    Osteoporosis Status: she has not had a bone density scan    Colonoscopy Status: she had a colonoscopy in 2021. The results were normal.    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     Cancer ([address]     back of arm right    Difficult intubation     Graves disease     Hypothyroidism     IUD (intrauterine device) in place 02/25/2008    Mirena    Overactive bladder     Pap smear for cervical cancer screening 06/03/2010    Neg    Pap smear for cervical cancer screening 12/02/2010    Neg    Pap smear for cervical cancer screening 12/06/2011    Neg    Vitamin B 12 deficiency        Past Surgical History:   Procedure Laterality Date    APPENDECTOMY 9/22/15    laprascopic    COLONOSCOPY  2008    Neg    COLONOSCOPY  2019    polyps divertics 5-10 year repeat    COLONOSCOPY N/A 7/3/2019    COLONOSCOPY POLYPECTOMY SNARE/COLD BIOPSY performed by Cathie Goyal MD at [address]  2005    HGSIL    COLPOSCOPY  2007    HPV, poss. mild dysplasia    GYNECOLOGIC CRYOSURGERY  2006    KNEE ARTHROSCOPY Right 2009    Dr. Gaetano Villalba Right     arm    TOTAL HIP ARTHROPLASTY      WRIST GANGLION EXCISION Left        Family History   Problem Relation Age of Onset    Heart Failure Mother     Cancer Maternal Aunt     Stroke Maternal Grandmother        Social History     Tobacco Use    Smoking status: Never Smoker    Smokeless tobacco: Never Used   Vaping Use    Vaping Use: Never used   Substance Use Topics    Alcohol use: Yes     Comment: moderate    Drug use: No       Current Outpatient Medications   Medication Sig Dispense Refill    fluticasone (FLONASE) 50 MCG/ACT nasal spray 2 sprays by Each Nostril route daily 3 each 1    levothyroxine (SYNTHROID) 112 MCG tablet Take 1 tablet by mouth every morning 90 tablet 1    oxybutynin (DITROPAN XL) 15 MG extended release tablet Take 2 tablets by mouth every morning 180 tablet 1    Cyanocobalamin (B-12 PO) Take 1,500 mcg by mouth every morning        No current facility-administered medications for this visit.        Allergies   Allergen Reactions    Latex Rash     tpae    Pollen Extract     Tape Tracie Just Tape] Rash           Immunization History   Administered Date(s) Administered    COVID-19, Pfizer Purple top, DILUTE for use, 12+ yrs, 30mcg/0.3mL dose 2021, 2021, 11/10/2021    Influenza A (T7E1-83) Vaccine PF IM 2009    Influenza Virus Vaccine 2016, 10/01/2018    Influenza Whole 10/26/2011    Influenza, High Dose (Fluzone 65 yrs and older) 10/07/2014    Influenza, Quadv, IM, PF (6 mo and older Fluzone, Flulaval, Fluarix, and 3 yrs and older Afluria) 11/21/2019    Tdap (Boostrix, Adacel) 06/28/2013, 06/26/2021       Review of Systems   Constitutional: Negative. Negative for fatigue and fever. Respiratory: Negative. Gastrointestinal: Negative. Negative for abdominal pain. Endocrine: Negative. Genitourinary: Positive for pelvic pain. Negative for dysuria, frequency, menstrual problem, vaginal bleeding, vaginal discharge and vaginal pain. Musculoskeletal: Negative. Skin: Negative. Neurological: Negative. Negative for dizziness and headaches. Psychiatric/Behavioral: Negative. BP (!) 143/82   Pulse 64   Ht 5' 8\" (1.727 m)   Wt 184 lb (83.5 kg)   BMI 27.98 kg/m²     Physical Exam  Vitals and nursing note reviewed. Constitutional:       General: She is not in acute distress. Appearance: Normal appearance. She is normal weight. HENT:      Head: Normocephalic and atraumatic. Pulmonary:      Effort: Pulmonary effort is normal. No respiratory distress. Chest:   Breasts:      Right: Normal. No axillary adenopathy or supraclavicular adenopathy. Left: Normal. No axillary adenopathy or supraclavicular adenopathy. Abdominal:      Palpations: Abdomen is soft. Tenderness: There is no abdominal tenderness. Genitourinary:     General: Normal vulva. Exam position: Lithotomy position. Vagina: Normal.      Cervix: Normal.      Uterus: Normal.       Adnexa: Right adnexa normal and left adnexa normal.   Musculoskeletal:         General: Normal range of motion. Cervical back: Normal range of motion. Lymphadenopathy:      Upper Body:      Right upper body: No supraclavicular or axillary adenopathy. Left upper body: No supraclavicular or axillary adenopathy. Skin:     General: Skin is warm and dry. Findings: No rash. Neurological:      General: No focal deficit present. Mental Status: She is alert and oriented to person, place, and time.    Psychiatric:         Mood and Affect: Mood normal.         Behavior: Behavior normal.         Thought Content: Thought content normal.         No results found for this visit on 04/25/22. Assessment and Plan   Diagnosis Orders   1. Women's annual routine gynecological examination  PAP SMEAR    C.trachomatis N.gonorrhoeae DNA, Thin Prep    DENILSON DIGITAL SCREEN W OR WO CAD BILATERAL    DEXA BONE DENSITY AXIAL SKELETON   2. Pelvic pain  US NON OB TRANSVAGINAL   3. Post-menopausal  DEXA BONE DENSITY AXIAL SKELETON   4. History of total left hip replacement       Mammo, dexa, pap, g/c pap, u/s this week for pain to rule out gynecologic cause    Discussed Randalyn Hazard therapy, questions/concerns addressed. Pt will call/return after u/s if she is interested in treatment, discussing further. No other concerns/complaints today    Return in about 1 day (around 4/26/2022) for ultrasound.     Estefania Saxena PA-C

## 2022-04-29 LAB
CHLAMYDIA BY THIN PREP: NEGATIVE
GC BY THIN PREP: NEGATIVE
HPV HIGH RISK: NOT DETECTED
HPV, GENOTYPE 16: NOT DETECTED
HPV, GENOTYPE 18: NOT DETECTED

## 2022-05-09 DIAGNOSIS — Z12.31 BREAST CANCER SCREENING BY MAMMOGRAM: Primary | ICD-10-CM

## 2022-05-10 ENCOUNTER — OFFICE VISIT (OUTPATIENT)
Dept: OBGYN | Age: 62
End: 2022-05-10
Payer: COMMERCIAL

## 2022-05-10 VITALS
BODY MASS INDEX: 26.89 KG/M2 | HEIGHT: 68 IN | SYSTOLIC BLOOD PRESSURE: 124 MMHG | WEIGHT: 177.4 LBS | DIASTOLIC BLOOD PRESSURE: 83 MMHG

## 2022-05-10 DIAGNOSIS — G89.29 CHRONIC FEMALE PELVIC PAIN: Primary | ICD-10-CM

## 2022-05-10 DIAGNOSIS — G89.29 CHRONIC RLQ PAIN: ICD-10-CM

## 2022-05-10 DIAGNOSIS — R10.31 CHRONIC RLQ PAIN: ICD-10-CM

## 2022-05-10 DIAGNOSIS — R10.2 CHRONIC FEMALE PELVIC PAIN: Primary | ICD-10-CM

## 2022-05-10 LAB
BACTERIA: ABNORMAL /HPF
BILIRUBIN URINE: NEGATIVE
BLOOD, URINE: ABNORMAL
CLARITY: CLEAR
COLOR: YELLOW
EPITHELIAL CELLS, UA: 0 /HPF (ref 0–5)
GLUCOSE URINE: NEGATIVE MG/DL
HYALINE CASTS: 0 /LPF (ref 0–8)
KETONES, URINE: 15 MG/DL
LEUKOCYTE ESTERASE, URINE: ABNORMAL
MICROSCOPIC EXAMINATION: YES
NITRITE, URINE: NEGATIVE
PH UA: 6.5 (ref 5–8)
PROTEIN UA: NEGATIVE MG/DL
RBC UA: 1 /HPF (ref 0–4)
SPECIFIC GRAVITY UA: 1.02 (ref 1–1.03)
URINE TYPE: ABNORMAL
UROBILINOGEN, URINE: 0.2 E.U./DL
WBC UA: 30 /HPF (ref 0–5)

## 2022-05-10 PROCEDURE — 99213 OFFICE O/P EST LOW 20 MIN: CPT | Performed by: OBSTETRICS & GYNECOLOGY

## 2022-05-10 NOTE — PROGRESS NOTES
5/10/22    Sheeba Saavedra  1960    Chief Complaint   Patient presents with    Follow-up     pt here to f/u and discuss u/s done on 4/27/22 due to pelvic pain, pt states her pelvic has since then decreased x1 wk. Sheeba Saavedra is a 57 y.o. female who presents today for evaluation of rlq and suprapubic pain which is improving. 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  Cancer ([address]     back of arm right    Difficult intubation     Graves disease     Hypothyroidism     IUD (intrauterine device) in place 02/25/2008    Mirena    Overactive bladder     Pap smear for cervical cancer screening 06/03/2010    Neg    Pap smear for cervical cancer screening 12/02/2010    Neg    Pap smear for cervical cancer screening 12/06/2011    Neg    Vitamin B 12 deficiency        Past Surgical History:   Procedure Laterality Date    APPENDECTOMY  9/22/15    laprascopic    COLONOSCOPY  08/2008    Neg    COLONOSCOPY  07/03/2019    polyps divertics 5-10 year repeat    COLONOSCOPY N/A 7/3/2019    COLONOSCOPY POLYPECTOMY SNARE/COLD BIOPSY performed by Oscar Fontana MD at [facility]  12/2005    HGSIL    COLPOSCOPY  12/2007    HPV, poss.  mild dysplasia    GYNECOLOGIC CRYOSURGERY  02/09/2006    KNEE ARTHROSCOPY Right 11/2009    Dr. Queen Ingram Right     arm    TOTAL HIP ARTHROPLASTY      WRIST GANGLION EXCISION Left        Social History     Tobacco Use    Smoking status: Never Smoker    Smokeless tobacco: Never Used   Vaping Use    Vaping Use: Never used   Substance Use Topics    Alcohol use: Yes     Comment: moderate    Drug use: No       Family History   Problem Relation Age of Onset    Heart Failure Mother     Cancer Maternal Aunt     Stroke Maternal Grandmother        Current Outpatient Medications   Medication Sig Dispense Refill    fluticasone (FLONASE) 50 MCG/ACT nasal spray 2 sprays by Each Nostril route daily 3 each 1    levothyroxine (SYNTHROID) 112 MCG tablet Take 1 tablet by mouth every morning 90 tablet 1    oxybutynin (DITROPAN XL) 15 MG extended release tablet Take 2 tablets by mouth every morning 180 tablet 1    Cyanocobalamin (B-12 PO) Take 1,500 mcg by mouth every morning        No current facility-administered medications for this visit. Allergies   Allergen Reactions    Latex Rash     tpae    Pollen Extract     Tape Dominga Cower Tape] Rash           Immunization History   Administered Date(s) Administered    COVID-19, Pfizer Purple top, DILUTE for use, 12+ yrs, 30mcg/0.3mL dose 2021, 2021, 11/10/2021    Influenza A (L5L7-00) Vaccine PF IM 2009    Influenza Virus Vaccine 2016, 10/01/2018    Influenza Whole 10/26/2011    Influenza, High Dose (Fluzone 65 yrs and older) 10/07/2014    Influenza, Quadv, IM, PF (6 mo and older Fluzone, Flulaval, Fluarix, and 3 yrs and older Afluria) 2019    Tdap (Boostrix, Adacel) 2013, 2021       Review of Systems   Genitourinary: Positive for pelvic pain. /83   Ht 5' 8\" (1.727 m)   Wt 177 lb 6.4 oz (80.5 kg)   BMI 26.97 kg/m²     Physical Exam  Constitutional:       General: She is not in acute distress. Appearance: Normal appearance. She is not ill-appearing. HENT:      Head: Normocephalic. Nose: Nose normal.   Eyes:      General: No scleral icterus. Right eye: No discharge. Left eye: No discharge. Cardiovascular:      Pulses: Normal pulses. Pulmonary:      Effort: Pulmonary effort is normal.   Abdominal:      General: Abdomen is flat. There is no distension. Palpations: Abdomen is soft. There is no mass. Tenderness: There is no abdominal tenderness. Hernia: No hernia is present. Musculoskeletal:         General: Normal range of motion. Cervical back: Normal range of motion and neck supple. No rigidity.    Skin:     General: Skin is warm and dry. Neurological:      General: No focal deficit present. Mental Status: She is alert and oriented to person, place, and time. Psychiatric:         Mood and Affect: Mood normal.         Behavior: Behavior normal.        Collected 4/25/2022 00:00     0 Result Notes     1  Topic      View Full Report       Narrative    Specimen #[id number]           Final Cytologic Diagnosis:   Cervical; 1 ThinPrep vial     Interpretation   Negative for intraepithelial lesion or malignancy. Atrophic pattern.       Adequacy   Satisfactory for evaluation. Reviewed by: Tammy HAMILTON   Electronically Signed Out By Tammy HAMILTON   Comment:     Human Papillomavirus (HPV) testing, High Risk with 16 and 18   Genotype by Nucleic Acid Amplification, ThinPrep (Performed   by [facility], [facility])     The Result is as follows:       HPV, High Risk:       Not Detected   HPV Genotype 16:  Not Detected   HPV Genotype 18:  Not Detected       For details of reference lab report, please see HPV, High   Risk with Genotyping in EPIC under Labs tab. Chlamydia and Gonorrhea testing by Transcription-Mediated   Amplification (TMA)  Thinprep (Performed by [place], [place])       The results are as follows:     C. trachomatis:   Negative   N. gonorrhoeae:  Negative       Per 2014 CDC recommendations, this test does not include   confirmation of positive results by an alternative nucleic   acid target. For details of reference lab report, please see Chlamydia   trachomatis and GC by amplified detection in EPIC under Labs   tab.        Clinical History:   Diagnostic codes: Z01.419   HPV Co-Test Requested   CT/NG Requested         Menstrual History:   Post-menopausal    Treatment History:   Cryotherapy: Gynecologic cryosurgery     Source:   Cervical; 1 ThinPrep vial             Processing:   Cervical; 1 ThinPrep vial   Thin Prep Pap (GYN) x 1       Received: 4/27/2022 12:18 Collected: 4/25/2022 00:00   Reported: 5/3/2022 09:42       Performed at [address] [facility], [address], [facility], [address] [facility]   Medical Director: Dulce Blake MD           Result Care Coordination      Patient Communication    Add Comments  Seen Back to Top          Satisfied Health Maintenance Topics       Back to Top  Cervical cancer screen (HPV (without or with Pap) - Preferred)  Next due on 4/25/2027  Address Topic                 Chlamydia trachomatis & GC by amplified detection  Order: [id number]   Collected 4/25/2022 09:55       0 Result Notes       Ref Range & Units 2 wk ago   Gc By Thin Prep Negative Negative    Comment: (NOTE)   INTERPRETIVE INFORMATION: CT/NG by TMA, ThinPrep   This test is intended for medical purposes only and is not valid   for the evaluation of suspected sexual abuse or for other forensic   purposes. In certain contexts, culture may be required to meet   applicable laws and regulations for diagnosis of C. trachomatis   and N. gonorrhoeae infections. Per 2014 CDC recommendations, this   test does not include confirmation of positive results by an   alternative nucleic acid target. Performed By: [address], [address] [place] [address]   : Tee Higgins.  Liberty Schwarz MD    Chlamydia By Thin Prep Negative Negative           View Full Report             Result Care Coordination      Patient Communication    Add Comments  Seen Back to Top             Collected Updated Procedure    04/16/2022 1143 04/18/2022 0735 TSH [[id number]]   (Abnormal)    Component Value Units   TSH, High Sensitivity 0.197 Low   uIu/ml           04/16/2022 1143 04/18/2022 0735 T4, Free [[id number]] Component Value Units   T4 Free 1.75  NG/DL           04/16/2022 1143 04/16/2022 1556 Lipid, Fasting [[id number]]   (Abnormal)   Blood    Component Value Units   Triglyceride, Fasting 62 MG/DL   Cholesterol, Fasting 196  MG/DL   HDL 82 MG/DL   LDL Calculated 102 High  MG/DL           04/16/2022 1143 04/16/2022 1247 CBC with Auto Differential [[id number]]   (Abnormal)   Blood    Component Value Units   WBC 3.9 Low  K/CU MM   RBC 4.56 M/CU MM   Hemoglobin 14.0 GM/DL   Hematocrit 44.1 %   MCV 96.7 FL   MCH 30.7 PG   MCHC 31.7 Low  %   RDW 11.9 %   Platelets 456 K/CU MM   MPV 9.9 FL   Differential Type AUTOMATED DIFFERENTIAL    Segs Relative 39.0 %   Lymphocytes % 48. 0 High  %   Monocytes % 10.2 High  %   Eosinophils % 2.0 %   Basophils % 0.8 %   Segs Absolute 1.5 K/CU MM   Lymphocytes Absolute 1.9 K/CU MM   Monocytes Absolute 0.4 K/CU MM   Eosinophils Absolute 0.1 K/CU MM   Basophils Absolute 0.0 K/CU MM   Nucleated RBC % 0.0 %   Total Nucleated RBC 0.0 K/CU MM   Total Immature Neutrophil 0.00 K/CU MM   Immature Neutrophil % 0.0 %           04/16/2022 1143 04/16/2022 1353 Comprehensive Metabolic Panel [[id number]]   Blood    Component Value Units   Sodium 141 MMOL/L   Potassium 4.4 MMOL/L   Chloride 104 mMol/L   CO2 26 MMOL/L   BUN 20 MG/DL   CREATININE 0.6 MG/DL   Glucose 93 MG/DL   Calcium 9.2 MG/DL   Albumin 4.3 GM/DL   Total Protein 7.1 GM/DL   Total Bilirubin 0.6 MG/DL   ALT 12 U/L   AST 19 IU/L   Alkaline Phosphatase 88 IU/L   GFR Non-African American >60 mL/min/1.73m2   GFR African American >60 mL/min/1.73m2   Anion Gap 11            04/16/2022 1143 04/23/2022 1502 Vitamin B12 & Folate [[id number]]   (Abnormal)   Blood    Component Value Units   Vitamin B-12 >2000 High  pg/ml   Folate 17. 0 NG/ML             See ultrasound report    No results found for this visit on 05/10/22. ASSESSMENT AND PLAN   Diagnosis Orders   1. Chronic female pelvic pain  Urinalysis with Microscopic    Blood Occult Stool Diagnostic    Blood Occult Stool Screen #2    Blood Occult Stool Screen #3   2. Chronic RLQ pain       Discussed normal pelvic ultrasound. last colonoscopy 2019  Discussed that pain does not appear gyneolcogic  Discussed RSO if no other cause of pain is found and if pain does not ocntinue to improve  Return if symptoms worsen or fail to improve.     Abiola Loyola MD

## 2022-05-11 ENCOUNTER — HOSPITAL ENCOUNTER (OUTPATIENT)
Dept: WOMENS IMAGING | Age: 62
Discharge: HOME | End: 2022-05-11
Payer: COMMERCIAL

## 2022-05-11 DIAGNOSIS — Z01.419 WOMEN'S ANNUAL ROUTINE GYNECOLOGICAL EXAMINATION: ICD-10-CM

## 2022-05-11 DIAGNOSIS — Z78.0 POST-MENOPAUSAL: ICD-10-CM

## 2022-05-11 DIAGNOSIS — Z12.31 BREAST CANCER SCREENING BY MAMMOGRAM: ICD-10-CM

## 2022-05-11 PROCEDURE — 77080 DXA BONE DENSITY AXIAL: CPT

## 2022-05-11 PROCEDURE — 77063 BREAST TOMOSYNTHESIS BI: CPT

## 2022-05-12 DIAGNOSIS — R82.81 PYURIA: Primary | ICD-10-CM

## 2022-05-12 RX ORDER — NITROFURANTOIN 25; 75 MG/1; MG/1
100 CAPSULE ORAL 2 TIMES DAILY
Qty: 14 CAPSULE | Refills: 0 | Status: SHIPPED | OUTPATIENT
Start: 2022-05-12 | End: 2022-05-13

## 2022-05-13 DIAGNOSIS — R82.81 PYURIA: ICD-10-CM

## 2022-05-13 RX ORDER — NITROFURANTOIN 25; 75 MG/1; MG/1
100 CAPSULE ORAL 2 TIMES DAILY
Qty: 14 CAPSULE | Refills: 0 | Status: SHIPPED | OUTPATIENT
Start: 2022-05-13 | End: 2022-05-20

## 2022-05-25 DIAGNOSIS — G89.29 CHRONIC FEMALE PELVIC PAIN: ICD-10-CM

## 2022-05-25 DIAGNOSIS — R10.2 CHRONIC FEMALE PELVIC PAIN: ICD-10-CM

## 2022-05-26 LAB
HEMOCCULT SP2 STL QL: NORMAL
HEMOCCULT SP3 STL QL: NORMAL
OCCULT BLOOD SCREENING: NORMAL

## 2022-09-04 DIAGNOSIS — N32.81 OVERACTIVE BLADDER: ICD-10-CM

## 2022-09-04 DIAGNOSIS — E89.0 POSTOPERATIVE HYPOTHYROIDISM: ICD-10-CM

## 2022-09-06 RX ORDER — OXYBUTYNIN CHLORIDE 15 MG/1
TABLET, EXTENDED RELEASE ORAL
Qty: 180 TABLET | Refills: 1 | Status: SHIPPED | OUTPATIENT
Start: 2022-09-06

## 2022-09-06 RX ORDER — LEVOTHYROXINE SODIUM 112 MCG
TABLET ORAL
Qty: 90 TABLET | Refills: 1 | Status: SHIPPED | OUTPATIENT
Start: 2022-09-06

## 2022-09-22 ENCOUNTER — OFFICE VISIT (OUTPATIENT)
Dept: FAMILY MEDICINE CLINIC | Age: 62
End: 2022-09-22
Payer: COMMERCIAL

## 2022-09-22 VITALS
SYSTOLIC BLOOD PRESSURE: 102 MMHG | BODY MASS INDEX: 26.83 KG/M2 | HEIGHT: 68 IN | WEIGHT: 177 LBS | OXYGEN SATURATION: 97 % | HEART RATE: 68 BPM | DIASTOLIC BLOOD PRESSURE: 74 MMHG

## 2022-09-22 DIAGNOSIS — J30.2 SEASONAL ALLERGIES: ICD-10-CM

## 2022-09-22 DIAGNOSIS — N32.81 OVERACTIVE BLADDER: ICD-10-CM

## 2022-09-22 DIAGNOSIS — E89.0 POSTOPERATIVE HYPOTHYROIDISM: ICD-10-CM

## 2022-09-22 DIAGNOSIS — R00.2 PALPITATIONS: Primary | ICD-10-CM

## 2022-09-22 PROCEDURE — 99214 OFFICE O/P EST MOD 30 MIN: CPT | Performed by: STUDENT IN AN ORGANIZED HEALTH CARE EDUCATION/TRAINING PROGRAM

## 2022-09-22 PROCEDURE — 93000 ELECTROCARDIOGRAM COMPLETE: CPT | Performed by: STUDENT IN AN ORGANIZED HEALTH CARE EDUCATION/TRAINING PROGRAM

## 2022-09-22 RX ORDER — FLUTICASONE PROPIONATE 50 MCG
2 SPRAY, SUSPENSION (ML) NASAL DAILY
Qty: 3 EACH | Refills: 1 | Status: SHIPPED | OUTPATIENT
Start: 2022-09-22

## 2022-09-22 ASSESSMENT — PATIENT HEALTH QUESTIONNAIRE - PHQ9
SUM OF ALL RESPONSES TO PHQ QUESTIONS 1-9: 0
1. LITTLE INTEREST OR PLEASURE IN DOING THINGS: 0
2. FEELING DOWN, DEPRESSED OR HOPELESS: 0
SUM OF ALL RESPONSES TO PHQ QUESTIONS 1-9: 0
SUM OF ALL RESPONSES TO PHQ9 QUESTIONS 1 & 2: 0

## 2022-09-22 NOTE — PROGRESS NOTES
10/24/2022    Linda Vaca    Chief Complaint   Patient presents with    6 Month Follow-Up    Palpitations     Pt reports concerns for heart palpitations, on and off for 1 month, patient reports palpitations have slowed down recently, patient notes she started a new job and feels this may be the cause       HPI  History was obtained from patient. Vanda Reyes is a 57 y.o. female with a PMHx as listed below who presents today for 6 monyth follow up. No acute complaints. Palpitations lasting seconds. Symptom onset usually at rest.    She notes sympotms similar to this in the past when mother past away   Palpitations daily,     1. Palpitations    2. Postoperative hypothyroidism    3. Overactive bladder    4. Seasonal allergies             REVIEW OF SYMPTOMS    Review of Systems   Constitutional:  Negative for chills and fatigue. HENT:  Negative for congestion and sore throat. Respiratory:  Negative for shortness of breath and wheezing. Cardiovascular:  Negative for chest pain and palpitations. Gastrointestinal:  Negative for abdominal pain and nausea. Genitourinary:  Negative for frequency and urgency. Neurological:  Negative for light-headedness. 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     Cancer ([address]     back of arm right    Difficult intubation     Graves disease     Hypothyroidism     IUD (intrauterine device) in place 02/25/2008    Mirena    Overactive bladder     Pap smear for cervical cancer screening 06/03/2010    Neg    Pap smear for cervical cancer screening 12/02/2010    Neg    Pap smear for cervical cancer screening 12/06/2011    Neg    Vitamin B 12 deficiency        FAMILY HISTORY  Family History   Problem Relation Age of Onset    Heart Failure Mother     Cancer Maternal Aunt     Stroke Maternal Grandmother     Breast Cancer Neg Hx     Ovarian Cancer Neg Hx        SOCIAL HISTORY  Social History     Socioeconomic History    Marital status:      Spouse name: None    Number of children: None    Years of education: None    Highest education level: None   Occupational History    Occupation:    Tobacco Use    Smoking status: Never    Smokeless tobacco: Never   Vaping Use    Vaping Use: Never used   Substance and Sexual Activity    Alcohol use: Yes     Comment: moderate    Drug use: No    Sexual activity: Yes     Partners: Male     Social Determinants of Health     Financial Resource Strain: Low Risk     Difficulty of Paying Living Expenses: Not hard at all   Food Insecurity: No Food Insecurity    Worried About [address] in the Last Year: Never true    [address] in the Last Year: Never true   Transportation Needs: No Transportation Needs    Lack of Transportation (Medical): No    Lack of Transportation (Non-Medical): No   Housing Stability: Unknown    Unable to Pay for Housing in the Last Year: No    Unstable Housing in the Last Year: No        SURGICAL HISTORY  Past Surgical History:   Procedure Laterality Date    APPENDECTOMY  9/22/15    laprascopic    COLONOSCOPY  08/2008    Neg    COLONOSCOPY  07/03/2019    polyps divertics 5-10 year repeat    COLONOSCOPY N/A 7/3/2019    COLONOSCOPY POLYPECTOMY SNARE/COLD BIOPSY performed by Edgar Reese MD at [address] [place] [address]  12/2005    HGSIL    COLPOSCOPY  12/2007    HPV, poss.  mild dysplasia    GYNECOLOGIC CRYOSURGERY  02/09/2006    KNEE ARTHROSCOPY Right 11/2009    Dr. Mark De Luna Right     arm    TOTAL HIP ARTHROPLASTY      WRIST GANGLION EXCISION Left                  CURRENT MEDICATIONS  Current Outpatient Medications   Medication Sig Dispense Refill    fluticasone (FLONASE) 50 MCG/ACT nasal spray 2 sprays by Each Nostril route daily 3 each 1    SYNTHROID 112 MCG tablet TAKE 1 TABLET EVERY MORNING 90 tablet 1    oxybutynin (DITROPAN XL) 15 MG extended release tablet TAKE 2 TABLETS EVERY       MORNING 180 tablet 1    Cyanocobalamin (B-12 PO) Take 1,500 mcg by mouth every morning        No current facility-administered medications for this visit. ALLERGIES  Allergies   Allergen Reactions    Latex Rash     tpae    Pollen Extract     Tape [Adhesive Tape] Rash       PHYSICAL EXAM    /74 (Site: Left Upper Arm, Position: Sitting, Cuff Size: Medium Adult)   Pulse 68   Ht 5' 8\" (1.727 m)   Wt 177 lb (80.3 kg)   SpO2 97%   BMI 26.91 kg/m²     Physical Exam  Constitutional:       Appearance: Normal appearance. HENT:      Head: Normocephalic and atraumatic. Eyes:      Extraocular Movements: Extraocular movements intact. Pupils: Pupils are equal, round, and reactive to light. Cardiovascular:      Rate and Rhythm: Normal rate and regular rhythm. Pulses: Normal pulses. Heart sounds: No murmur heard. No friction rub. No gallop. Skin:     General: Skin is warm and dry. Neurological:      General: No focal deficit present. Mental Status: She is alert. Psychiatric:         Mood and Affect: Mood normal.         Behavior: Behavior normal.       ASSESSMENT & PLAN    1. Postoperative hypothyroidism    - TSH with Reflex to FT4; Future  - EKG 12 Lead  - Magnesium; Future  - Basic Metabolic Panel; Future    2. Overactive bladder      3. Seasonal allergies    - fluticasone (FLONASE) 50 MCG/ACT nasal spray; 2 sprays by Each Nostril route daily  Dispense: 3 each; Refill: 1    4. Palpitations    - EKG 12 Lead  - Basic Metabolic Panel; Future  - Holter Monitor 24 Hour; Future    Obtain holter consider cardiology  referral  EKG NSR    Return in about 2 months (around 11/22/2022).          Electronically signed by Josie Gotti DO on 10/24/2022

## 2022-10-24 ASSESSMENT — ENCOUNTER SYMPTOMS
NAUSEA: 0
ABDOMINAL PAIN: 0
WHEEZING: 0
SHORTNESS OF BREATH: 0
SORE THROAT: 0

## 2022-11-04 ENCOUNTER — TELEPHONE (OUTPATIENT)
Dept: FAMILY MEDICINE CLINIC | Age: 62
End: 2022-11-04

## 2022-11-04 NOTE — TELEPHONE ENCOUNTER
Spoke with patient, improving. Tmax 99.8. Discussion borderline qualifies paxlovid. Patient will continue conservative therapy.

## 2022-11-04 NOTE — TELEPHONE ENCOUNTER
----- Message from Kurt Godoy sent at 11/4/2022  3:30 PM EDT -----  Subject: Message to Provider    QUESTIONS  Information for Provider? Patient has tested positive for COVID and would   like a phone call to discuss next steps. Cough, headache, runny nose,   fever. She did not want to make an appt unless needed. Please call to   discuss if she needs an appt or treatment.   ---------------------------------------------------------------------------  --------------  Mello Blake [facility]  [phone]; OK to leave message on voicemail  ---------------------------------------------------------------------------  --------------  SCRIPT ANSWERS  Relationship to Patient?  Self

## 2022-11-11 ENCOUNTER — HOSPITAL ENCOUNTER (OUTPATIENT)
Dept: NON INVASIVE DIAGNOSTICS | Age: 62
Discharge: HOME | End: 2022-11-11

## 2022-11-11 DIAGNOSIS — R00.2 PALPITATIONS: ICD-10-CM

## 2022-11-16 LAB
ACQUISITION DURATION: NORMAL S
AVERAGE HEART RATE: 73 BPM
EKG DIAGNOSIS: NORMAL
HOOKUP DATE: NORMAL
HOOKUP TIME: NORMAL
MAX HEART RATE TIME/DATE: NORMAL
MAX HEART RATE: 106 BPM
MIN HEART RATE TIME/DATE: NORMAL
MIN HEART RATE: 49 BPM
NUMBER OF QRS COMPLEXES: NORMAL
NUMBER OF SUPRAVENTRICULAR COUPLETS: 0
NUMBER OF SUPRAVENTRICULAR ECTOPICS: 22
NUMBER OF SUPRAVENTRICULAR ISOLATED BEATS: 20
NUMBER OF VENTRICULAR BIGEMINAL CYCLES: 4
NUMBER OF VENTRICULAR COUPLETS: 0
NUMBER OF VENTRICULAR ECTOPICS: 63

## 2022-11-18 NOTE — RESULT ENCOUNTER NOTE
Chris Abarca your holter showed normal premature beats otherwise normal we will discuss further at your appointment

## 2022-11-21 ENCOUNTER — TELEPHONE (OUTPATIENT)
Dept: CARDIOLOGY CLINIC | Age: 62
End: 2022-11-21

## 2022-11-21 ENCOUNTER — OFFICE VISIT (OUTPATIENT)
Dept: FAMILY MEDICINE CLINIC | Age: 62
End: 2022-11-21
Payer: COMMERCIAL

## 2022-11-21 VITALS
HEART RATE: 76 BPM | WEIGHT: 183.8 LBS | DIASTOLIC BLOOD PRESSURE: 78 MMHG | HEIGHT: 68 IN | RESPIRATION RATE: 16 BRPM | OXYGEN SATURATION: 96 % | BODY MASS INDEX: 27.86 KG/M2 | SYSTOLIC BLOOD PRESSURE: 120 MMHG

## 2022-11-21 DIAGNOSIS — R00.2 PALPITATIONS: ICD-10-CM

## 2022-11-21 DIAGNOSIS — E53.8 VITAMIN B 12 DEFICIENCY: ICD-10-CM

## 2022-11-21 DIAGNOSIS — E53.8 VITAMIN B 12 DEFICIENCY: Primary | ICD-10-CM

## 2022-11-21 DIAGNOSIS — E89.0 POSTOPERATIVE HYPOTHYROIDISM: ICD-10-CM

## 2022-11-21 DIAGNOSIS — H61.22 LEFT EAR IMPACTED CERUMEN: ICD-10-CM

## 2022-11-21 LAB
ANION GAP SERPL CALCULATED.3IONS-SCNC: 16 MMOL/L (ref 3–16)
BUN BLDV-MCNC: 22 MG/DL (ref 7–20)
CALCIUM SERPL-MCNC: 9.4 MG/DL (ref 8.3–10.6)
CHLORIDE BLD-SCNC: 103 MMOL/L (ref 99–110)
CO2: 22 MMOL/L (ref 21–32)
CREAT SERPL-MCNC: 0.5 MG/DL (ref 0.6–1.2)
GFR SERPL CREATININE-BSD FRML MDRD: >60 ML/MIN/{1.73_M2}
GLUCOSE BLD-MCNC: 77 MG/DL (ref 70–99)
MAGNESIUM: 2.2 MG/DL (ref 1.8–2.4)
POTASSIUM SERPL-SCNC: 4.5 MMOL/L (ref 3.5–5.1)
SODIUM BLD-SCNC: 141 MMOL/L (ref 136–145)
TSH REFLEX FT4: 2.08 UIU/ML (ref 0.27–4.2)

## 2022-11-21 PROCEDURE — 99213 OFFICE O/P EST LOW 20 MIN: CPT | Performed by: STUDENT IN AN ORGANIZED HEALTH CARE EDUCATION/TRAINING PROGRAM

## 2022-11-21 PROCEDURE — 69209 REMOVE IMPACTED EAR WAX UNI: CPT | Performed by: STUDENT IN AN ORGANIZED HEALTH CARE EDUCATION/TRAINING PROGRAM

## 2022-11-21 ASSESSMENT — ENCOUNTER SYMPTOMS
ABDOMINAL PAIN: 0
WHEEZING: 0
NAUSEA: 0
SORE THROAT: 0
SHORTNESS OF BREATH: 0

## 2022-11-21 NOTE — TELEPHONE ENCOUNTER
Left message for patient requesting a return call to schedule a consult with Felicia for palpitations per referral from Dr. Teresita Valdez.

## 2022-11-21 NOTE — PROGRESS NOTES
11/21/2022    Saskia Campo    Chief Complaint   Patient presents with    1 Month Follow-Up     2 month ck - discuss holter results   Waiting until next week on flu vaccine     Other     Would like to have you look in her ears. Said she doesn't have wax come out. They are not bothering her at this time. HPI  History was obtained from patient. Elisha Garsia is a 57 y.o. female with a PMHx as listed below who presents today for follow up palpitaitons. Recent holter showing PVCs otherwise normal    She states still gets palpitations, lasting minutes. Not related to specific event. Not sure if anything makes better. Originally thought to be anxiety but that has resolved and still has palpitations. She admits to snoring but no daytime naps, no choking sensation when she sleeps. 1. Vitamin B 12 deficiency    2. Palpitations    3. Left ear impacted cerumen             REVIEW OF SYMPTOMS    Review of Systems   Constitutional:  Negative for chills and fatigue. HENT:  Negative for congestion and sore throat. Respiratory:  Negative for shortness of breath and wheezing. Cardiovascular:  Positive for palpitations. Negative for chest pain. Gastrointestinal:  Negative for abdominal pain and nausea. Genitourinary:  Negative for frequency and urgency. Neurological:  Negative for light-headedness. 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     Cancer ([address]     back of arm right    Difficult intubation     Graves disease     Hypothyroidism     IUD (intrauterine device) in place 02/25/2008    Mirena    Overactive bladder     Pap smear for cervical cancer screening 06/03/2010    Neg    Pap smear for cervical cancer screening 12/02/2010    Neg    Pap smear for cervical cancer screening 12/06/2011    Neg    Vitamin B 12 deficiency        FAMILY HISTORY  Family History   Problem Relation Age of Onset    Heart Failure Mother     Cancer Maternal Aunt     Stroke Maternal Grandmother     Breast Cancer Neg Hx     Ovarian Cancer Neg Hx        SOCIAL HISTORY  Social History     Socioeconomic History    Marital status:      Spouse name: None    Number of children: None    Years of education: None    Highest education level: None   Occupational History    Occupation:    Tobacco Use    Smoking status: Never    Smokeless tobacco: Never   Vaping Use    Vaping Use: Never used   Substance and Sexual Activity    Alcohol use: Yes     Comment: moderate    Drug use: No    Sexual activity: Yes     Partners: Male     Social Determinants of Health     Financial Resource Strain: Low Risk     Difficulty of Paying Living Expenses: Not hard at all   Food Insecurity: No Food Insecurity    Worried About [address] in the Last Year: Never true    [address] in the Last Year: Never true   Transportation Needs: No Transportation Needs    Lack of Transportation (Medical): No    Lack of Transportation (Non-Medical): No   Housing Stability: Unknown    Unable to Pay for Housing in the Last Year: No    Unstable Housing in the Last Year: No        SURGICAL HISTORY  Past Surgical History:   Procedure Laterality Date    APPENDECTOMY  9/22/15    laprascopic    COLONOSCOPY  08/2008    Neg    COLONOSCOPY  07/03/2019    polyps divertics 5-10 year repeat    COLONOSCOPY N/A 7/3/2019    COLONOSCOPY POLYPECTOMY SNARE/COLD BIOPSY performed by Josué Cantu MD at [address] [place] [address]  12/2005    HGSIL    COLPOSCOPY  12/2007    HPV, poss.  mild dysplasia    GYNECOLOGIC CRYOSURGERY  02/09/2006    KNEE ARTHROSCOPY Right 11/2009    Dr. Willard Courser Right     arm    TOTAL HIP ARTHROPLASTY      WRIST GANGLION EXCISION Left                  CURRENT MEDICATIONS  Current Outpatient Medications   Medication Sig Dispense Refill    fluticasone (FLONASE) 50 MCG/ACT nasal spray 2 sprays by Each Nostril route daily 3 each 1    SYNTHROID 112 MCG tablet TAKE 1 TABLET EVERY MORNING 90 tablet 1    oxybutynin (DITROPAN XL) 15 MG extended release tablet TAKE 2 TABLETS EVERY       MORNING 180 tablet 1    Cyanocobalamin (B-12 PO) Take 1,500 mcg by mouth every morning        No current facility-administered medications for this visit. ALLERGIES  Allergies   Allergen Reactions    Latex Rash     tpae    Pollen Extract     Tape [Adhesive Tape] Rash       PHYSICAL EXAM    /78 (Site: Left Upper Arm, Position: Sitting, Cuff Size: Medium Adult)   Pulse 76   Resp 16   Ht 5' 8\" (1.727 m)   Wt 183 lb 12.8 oz (83.4 kg)   SpO2 96%   BMI 27.95 kg/m²     Physical Exam  Constitutional:       Appearance: Normal appearance. HENT:      Head: Normocephalic and atraumatic. Right Ear: Tympanic membrane, ear canal and external ear normal.      Left Ear: Tympanic membrane, ear canal and external ear normal. There is impacted cerumen. Eyes:      Extraocular Movements: Extraocular movements intact. Pupils: Pupils are equal, round, and reactive to light. Cardiovascular:      Rate and Rhythm: Normal rate and regular rhythm. Pulses: Normal pulses. Heart sounds: No murmur heard. No friction rub. No gallop. Pulmonary:      Effort: Pulmonary effort is normal.      Breath sounds: Normal breath sounds. Skin:     General: Skin is warm and dry. Neurological:      General: No focal deficit present. Mental Status: She is alert. Psychiatric:         Mood and Affect: Mood normal.         Behavior: Behavior normal.       ASSESSMENT & PLAN    1. Vitamin B 12 deficiency    - Vitamin B12 & Folate; Future    2. Corrine Ramirez MD, [facility]    3. Left ear impacted cerumen    We will refer to cardio further eval, f/u labs, holter reviewed    Neg stop bang test  Cerumen impaction left ear, unable to remove with lavage, debrox at home, consider ENT consult    Return in about 4 months (around 3/21/2023). Electronically signed by Tung Ugalde DO on 11/21/2022

## 2022-11-22 LAB
FOLATE: 10.92 NG/ML (ref 4.78–24.2)
VITAMIN B-12: 1491 PG/ML (ref 211–911)

## 2022-12-06 ENCOUNTER — INITIAL CONSULT (OUTPATIENT)
Dept: CARDIOLOGY CLINIC | Age: 62
End: 2022-12-06
Payer: COMMERCIAL

## 2022-12-06 VITALS
HEIGHT: 68 IN | DIASTOLIC BLOOD PRESSURE: 78 MMHG | WEIGHT: 187.4 LBS | HEART RATE: 75 BPM | SYSTOLIC BLOOD PRESSURE: 120 MMHG | OXYGEN SATURATION: 95 % | BODY MASS INDEX: 28.4 KG/M2

## 2022-12-06 DIAGNOSIS — R00.2 PALPITATION: Primary | ICD-10-CM

## 2022-12-06 DIAGNOSIS — E89.0 POSTOPERATIVE HYPOTHYROIDISM: ICD-10-CM

## 2022-12-06 DIAGNOSIS — R00.2 HEART PALPITATIONS: ICD-10-CM

## 2022-12-06 PROCEDURE — 99204 OFFICE O/P NEW MOD 45 MIN: CPT | Performed by: INTERNAL MEDICINE

## 2022-12-06 PROCEDURE — 93000 ELECTROCARDIOGRAM COMPLETE: CPT | Performed by: INTERNAL MEDICINE

## 2022-12-06 NOTE — ASSESSMENT & PLAN NOTE
Which showed PVCs and bigeminy's mostly in the evening time? Twice to cut down on vitamin supplements and water flavor additives. Get 30-day monitor. TSH has been normal we will get echo to rule out structural heart disease and a stress test to rule out ischemia as etiology and see PVC burden with exercise and stress

## 2022-12-06 NOTE — PATIENT INSTRUCTIONS
Please be informed that if you contact our office outside of normal business hours the physician on call cannot help with any scheduling or rescheduling issues, procedure instruction questions or any type of medication issue. We advise you for any urgent/emergency that you go to the nearest emergency room! PLEASE CALL OUR OFFICE DURING NORMAL BUSINESS HOURS    Monday - Friday   8 am to 5 pm    [facility] [address]: [phone]    [facility]:  [phone]    **It is YOUR responsibilty to bring medication bottles and/or updated medication list to [address] [place] [address]. This will allow us to better serve you and all your healthcare needs**    Thank you for allowing us to care for you today! We want to ensure we can follow your treatment plan and we strive to give you the best outcomes and experience possible. If you ever have a life threatening emergency and call 911 - for an ambulance (EMS)   Our providers can only care for you at:   [facility] or [facility]. Even if you have someone take you or you drive yourself we can only care for you in a [facility] facility. Our providers are not setup at the other healthcare locations!

## 2022-12-06 NOTE — ASSESSMENT & PLAN NOTE
History of Graves' disease s/p ablation currently on levothyroxine TSH is normal.  Went over the risk of A. fib only if her TSH is abnormal

## 2022-12-06 NOTE — PROGRESS NOTES
CARDIOLOGY  NOTE    Chief Complaint: Palpitations    HPI:   Luciano Guerrero is a 57y.o. year old who has Past medical history as noted below. She comes in for evaluation due to intermittent palpitations she describes them as the same sensations that she had when her thyroid was abnormal.  Had hyperthyroid in 20's and then had ablation in 2012 has been on thyroid replacment   Notices some palpitations mostly in the evening when she is more relaxed on Holter monitor where she was noted to have PVCs with bigeminy. She reports intermittent palpitations for some time now but they are gradually more symptomatic they can last few seconds she does drink a flavor additive to her water which has multivitamin excluding vitamin B12 her vitamin B12 levels have been elevated  Does not drink any alcohol energy drinks or coffee drinks rare ice soda there is no family history of heart problems  He does physically demanding work Works as a  her thyroid functions have been generally well controlled    Current Outpatient Medications   Medication Sig Dispense Refill    fluticasone (FLONASE) 50 MCG/ACT nasal spray 2 sprays by Each Nostril route daily 3 each 1    SYNTHROID 112 MCG tablet TAKE 1 TABLET EVERY MORNING 90 tablet 1    oxybutynin (DITROPAN XL) 15 MG extended release tablet TAKE 2 TABLETS EVERY       MORNING 180 tablet 1    Cyanocobalamin (B-12 PO) Take 1,500 mcg by mouth every morning        No current facility-administered medications for this visit. 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     Cancer ([address]     back of arm right    Difficult intubation     Graves disease     Hypothyroidism     IUD (intrauterine device) in place 02/25/2008    Mirena    Overactive bladder     Pap smear for cervical cancer screening 06/03/2010 Neg    Pap smear for cervical cancer screening 12/02/2010    Neg    Pap smear for cervical cancer screening 12/06/2011    Neg    Vitamin B 12 deficiency      Past Surgical History:   Procedure Laterality Date    APPENDECTOMY  9/22/15    laprascopic    COLONOSCOPY  08/2008    Neg    COLONOSCOPY  07/03/2019    polyps divertics 5-10 year repeat    COLONOSCOPY N/A 7/3/2019    COLONOSCOPY POLYPECTOMY SNARE/COLD BIOPSY performed by Gabi Gunderson MD at [address] [place] [address]  12/2005    HGSIL    COLPOSCOPY  12/2007    HPV, poss. mild dysplasia    GYNECOLOGIC CRYOSURGERY  02/09/2006    KNEE ARTHROSCOPY Right 11/2009    Dr. Tosha Mcclelland Right     arm    TOTAL HIP ARTHROPLASTY      WRIST GANGLION EXCISION Left      Family History   Problem Relation Age of Onset    Heart Failure Mother     Cancer Maternal Aunt     Stroke Maternal Grandmother     Breast Cancer Neg Hx     Ovarian Cancer Neg Hx      Social History     Tobacco Use    Smoking status: Never    Smokeless tobacco: Never   Substance Use Topics    Alcohol use: Yes     Comment: moderate        Review of Systems:   Constitutional: No Fever or Weight Loss   Eyes: No Decreased Vision  ENT: No Headaches, Hearing Loss or Vertigo  Cardiovascular: as per note above   Respiratory: No cough or wheezing and as per note above.    Gastrointestinal: No abdominal pain, appetite loss, blood in stools, constipation, diarrhea or heartburn  Genitourinary: No dysuria, trouble voiding, or hematuria  Musculoskeletal:  denies any new  joint aches , swelling  or pain   Integumentary: No rash or pruritis  Neurological: No TIA or stroke symptoms  Psychiatric: No anxiety or depression  Endocrine: No malaise, fatigue or temperature intolerance  Hematologic/Lymphatic: No bleeding problems, blood clots or swollen lymph nodes  Allergic/Immunologic: No nasal congestion or hives    Objective:      Physical Exam:  /78 (Site: Right Upper Arm, Position: Sitting, Cuff Size: Medium Adult)   Pulse 75   Ht 5' 8\" (1.727 m)   Wt 187 lb 6.4 oz (85 kg)   SpO2 95%   BMI 28.49 kg/m²   Wt Readings from Last 3 Encounters:   12/06/22 187 lb 6.4 oz (85 kg)   11/21/22 183 lb 12.8 oz (83.4 kg)   09/22/22 177 lb (80.3 kg)     Body mass index is 28.49 kg/m². Vitals:    12/06/22 1423   BP: 120/78   Pulse: 75   SpO2: 95%        General Appearance:  No distress, conversant  Constitutional:  Well developed, Well nourished, No acute distress, Non-toxic appearance. HENT:  Normocephalic, Atraumatic, Bilateral external ears normal, Oropharynx moist, No oral exudates, Nose normal. Neck- Normal range of motion, No tenderness, Supple, No stridor,no apical-carotid delay  Eyes:  PERRL, EOMI, Conjunctiva normal, No discharge. Respiratory:  Normal breath sounds, No respiratory distress, No wheezing, No chest tenderness. ,no use of accessory muscles, NO crackles  Cardiovascular: (PMI) apex non displaced,no lifts no thrills,S1 and S2 audible, No added heart sounds, No signs of ankle edema, or volume overload, No evidence of JVD, No crackles   GI:  Bowel sounds normal, Soft, No tenderness, No masses, No gross visceromegaly   :  No costovertebral angle tenderness   Musculoskeletal:  No edema, no tenderness, no deformities.  Back- no tenderness  Integument:  Well hydrated, no rash   Lymphatic:  No lymphadenopathy noted   Neurologic:  Alert & oriented x 3, CN 2-12 normal, normal motor function, normal sensory function, no focal deficits noted   Psychiatric:  Speech and behavior appropriate       Medical decision making and Data review:  DATA:  No results found for: TROPONINT  BNP:  No results found for: PROBNP  PT/INR:  No results found for: PTINR  No results found for: LABA1C  Lab Results   Component Value Date    CHOL 215 (H) 12/02/2020    TRIG 60 12/02/2020    HDL 82 04/16/2022    LDLCALC 102 (H) 04/16/2022    LDLDIRECT 113 (H) 12/02/2020     Lab Results   Component Value Date    ALT 12 04/16/2022    AST 19 04/16/2022     No results for input(s): WBC, HGB, HCT, MCV, PLT in the last 72 hours. TSH:   Lab Results   Component Value Date    TSH 0.30 12/06/2019     Lab Results   Component Value Date    AST 19 04/16/2022    ALT 12 04/16/2022    BILITOT 0.6 04/16/2022    ALKPHOS 88 04/16/2022     No results found for: PROBNP  No results found for: LABA1C  Lab Results   Component Value Date    WBC 3.9 (L) 04/16/2022    HGB 14.0 04/16/2022    HCT 44.1 04/16/2022     04/16/2022     All labs, medications and tests reviewed by myself including data and history from outside source , patient and available family . Assessment & Plan:      1. Palpitation    2. Postoperative hypothyroidism    3. Heart palpitations         Heart palpitations   Which showed PVCs and bigeminy's mostly in the evening time? Twice to cut down on vitamin supplements and water flavor additives. Get 30-day monitor. TSH has been normal we will get echo to rule out structural heart disease and a stress test to rule out ischemia as etiology and see PVC burden with exercise and stress    Hypothyroidism   History of Graves' disease s/p ablation currently on levothyroxine TSH is normal.  Went over the risk of A. fib only if her TSH is abnormal     Dyslipidemia :  All available lab work was reviewed. Patient was advised to repeat lab work before next visit. Necessary orders were placed , instructions given by myself       Counseled extensively and medication compliance urged. We discussed that for the  prevention of ASCVD our  goal is aggressive risk modification. Patient is encouraged to exercise if they can , educated about  brisk walk for 30 minutes  at least 3 to 4 times a week if there are no physical limitations  Various goals were discussed and questions answered. Continue current medications. Appropriate prescriptions are addressed and refills ordered. Questions answered and patient verbalizes understanding.   Call for any problems, questions, or concerns. Greater than 60 % of time spent counseling besides reviewing data and images     Continue all other medications of all above medical condition listed as is. Return in about 2 months (around 2/6/2023). Please note this report has been partially produced using speech recognition software and may contain errors related to that system including errors in grammar, punctuation, and spelling, as well as words and phrases that may be inappropriate. If there are any questions or concerns please feel free to contact the dictating provider for clarification.

## 2023-01-29 DIAGNOSIS — E89.0 POSTOPERATIVE HYPOTHYROIDISM: ICD-10-CM

## 2023-01-29 DIAGNOSIS — N32.81 OVERACTIVE BLADDER: ICD-10-CM

## 2023-01-30 RX ORDER — OXYBUTYNIN CHLORIDE 15 MG/1
TABLET, EXTENDED RELEASE ORAL
Qty: 180 TABLET | Refills: 1 | OUTPATIENT
Start: 2023-01-30

## 2023-01-30 RX ORDER — LEVOTHYROXINE SODIUM 112 MCG
TABLET ORAL
Qty: 90 TABLET | Refills: 1 | OUTPATIENT
Start: 2023-01-30

## 2023-02-20 ENCOUNTER — TELEPHONE (OUTPATIENT)
Dept: CARDIOLOGY CLINIC | Age: 63
End: 2023-02-20

## 2023-02-20 ENCOUNTER — PROCEDURE VISIT (OUTPATIENT)
Dept: CARDIOLOGY CLINIC | Age: 63
End: 2023-02-20
Payer: COMMERCIAL

## 2023-02-20 DIAGNOSIS — R00.2 PALPITATION: ICD-10-CM

## 2023-02-20 DIAGNOSIS — R00.2 HEART PALPITATIONS: ICD-10-CM

## 2023-02-20 DIAGNOSIS — R94.31 ABNORMAL ECG DURING EXERCISE STRESS TEST: Primary | ICD-10-CM

## 2023-02-20 DIAGNOSIS — E89.0 POSTOPERATIVE HYPOTHYROIDISM: ICD-10-CM

## 2023-02-20 LAB
LV EF: 58 %
LVEF MODALITY: NORMAL

## 2023-02-20 PROCEDURE — 93306 TTE W/DOPPLER COMPLETE: CPT | Performed by: INTERNAL MEDICINE

## 2023-02-20 PROCEDURE — 93015 CV STRESS TEST SUPVJ I&R: CPT | Performed by: INTERNAL MEDICINE

## 2023-02-21 ENCOUNTER — TELEPHONE (OUTPATIENT)
Dept: CARDIOLOGY CLINIC | Age: 63
End: 2023-02-21

## 2023-02-21 ENCOUNTER — OFFICE VISIT (OUTPATIENT)
Dept: CARDIOLOGY CLINIC | Age: 63
End: 2023-02-21
Payer: COMMERCIAL

## 2023-02-21 VITALS
HEIGHT: 68 IN | HEART RATE: 73 BPM | SYSTOLIC BLOOD PRESSURE: 120 MMHG | OXYGEN SATURATION: 97 % | DIASTOLIC BLOOD PRESSURE: 78 MMHG | WEIGHT: 175 LBS | BODY MASS INDEX: 26.52 KG/M2

## 2023-02-21 DIAGNOSIS — R00.2 HEART PALPITATIONS: Primary | ICD-10-CM

## 2023-02-21 DIAGNOSIS — E89.0 POSTOPERATIVE HYPOTHYROIDISM: ICD-10-CM

## 2023-02-21 DIAGNOSIS — E05.00 GRAVES DISEASE: ICD-10-CM

## 2023-02-21 PROCEDURE — 1123F ACP DISCUSS/DSCN MKR DOCD: CPT | Performed by: INTERNAL MEDICINE

## 2023-02-21 PROCEDURE — 99214 OFFICE O/P EST MOD 30 MIN: CPT | Performed by: INTERNAL MEDICINE

## 2023-02-21 RX ORDER — MAGNESIUM OXIDE 400 MG/1
400 TABLET ORAL DAILY
Qty: 30 TABLET | Refills: 1 | Status: SHIPPED | OUTPATIENT
Start: 2023-02-21

## 2023-02-21 NOTE — TELEPHONE ENCOUNTER
Summary   Left ventricular function and size is normal, EF is estimated at 55-60%. Mild left ventricular hypertrophy. Grade I diastolic dysfunction. No regional wall motion abnormalities were detected. Mild mitral, tricuspid and moderate aortic regurgitation is present. RVSP is 35 mmHg. No evidence of pericardial effusion. Spoke to pt regarding results. Patient advised and voices understanding.

## 2023-02-21 NOTE — PROGRESS NOTES
CARDIOLOGY  NOTE    Chief Complaint: Palpitations    HPI:   Parth Jain is a 70y.o. year old who has Past medical history as noted below. She comes in for evaluation due to intermittent palpitations she describes them as the same sensations that she had when her thyroid was abnormal. On treadmill she had Ischemia therefore asked to get Elen Bolder which is pending, she is leaving on 2 week cruise in march and wont be back til end of march   Had hyperthyroid in 20's and then had ablation in 2012 has been on thyroid replacment   Notices some palpitations mostly in the evening when she is more relaxed on Holter monitor where she was noted to have PVCs with bigeminy. She reports intermittent palpitations for some time now but they are gradually more symptomatic they can last few seconds she does drink a flavor additive to her water which has multivitamin excluding vitamin B12 her vitamin B12 levels have been elevated  Does not drink any alcohol energy drinks or coffee drinks rare ice soda there is no family history of heart problems  He does physically demanding work Works as a  her thyroid functions have been generally well controlled    Current Outpatient Medications   Medication Sig Dispense Refill    metoprolol tartrate (LOPRESSOR) 25 MG tablet Take 1 tablet by mouth 2 times daily 60 tablet 3    magnesium oxide (MAG-OX) 400 MG tablet Take 1 tablet by mouth daily 30 tablet 1    fluticasone (FLONASE) 50 MCG/ACT nasal spray 2 sprays by Each Nostril route daily 3 each 1    SYNTHROID 112 MCG tablet TAKE 1 TABLET EVERY MORNING 90 tablet 1    oxybutynin (DITROPAN XL) 15 MG extended release tablet TAKE 2 TABLETS EVERY       MORNING 180 tablet 1    Cyanocobalamin (B-12 PO) Take 1,500 mcg by mouth every morning        No current facility-administered medications for this visit.        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 Cancer ([address]     back of arm right    Difficult intubation     Graves disease     Hypothyroidism     IUD (intrauterine device) in place 02/25/2008    Mirena    Overactive bladder     Pap smear for cervical cancer screening 06/03/2010    Neg    Pap smear for cervical cancer screening 12/02/2010    Neg    Pap smear for cervical cancer screening 12/06/2011    Neg    Vitamin B 12 deficiency      Past Surgical History:   Procedure Laterality Date    APPENDECTOMY  9/22/15    laprascopic    COLONOSCOPY  08/2008    Neg    COLONOSCOPY  07/03/2019    polyps divertics 5-10 year repeat    COLONOSCOPY N/A 7/3/2019    COLONOSCOPY POLYPECTOMY SNARE/COLD BIOPSY performed by Mer Bush MD at [address] [place] [address] Blvd  12/2005    HGSIL    COLPOSCOPY  12/2007    HPV, poss. mild dysplasia    GYNECOLOGIC CRYOSURGERY  02/09/2006    KNEE ARTHROSCOPY Right 11/2009    Dr. Bautista Salcedo Right     arm    TOTAL HIP ARTHROPLASTY      WRIST GANGLION EXCISION Left      Family History   Problem Relation Age of Onset    Heart Failure Mother     Cancer Maternal Aunt     Stroke Maternal Grandmother     Breast Cancer Neg Hx     Ovarian Cancer Neg Hx      Social History     Tobacco Use    Smoking status: Never    Smokeless tobacco: Never   Substance Use Topics    Alcohol use: Not Currently     Comment: occ. Review of Systems:   Constitutional: No Fever or Weight Loss   Eyes: No Decreased Vision  ENT: No Headaches, Hearing Loss or Vertigo  Cardiovascular: as per note above   Respiratory: No cough or wheezing and as per note above.    Gastrointestinal: No abdominal pain, appetite loss, blood in stools, constipation, diarrhea or heartburn  Genitourinary: No dysuria, trouble voiding, or hematuria  Musculoskeletal:  denies any new  joint aches , swelling  or pain   Integumentary: No rash or pruritis  Neurological: No TIA or stroke symptoms  Psychiatric: No anxiety or depression  Endocrine: No malaise, fatigue or temperature intolerance  Hematologic/Lymphatic: No bleeding problems, blood clots or swollen lymph nodes  Allergic/Immunologic: No nasal congestion or hives    Objective:      Physical Exam:  /78 (Site: Left Upper Arm, Position: Sitting, Cuff Size: Medium Adult)   Pulse 73   Ht 5' 8\" (1.727 m)   Wt 175 lb (79.4 kg)   SpO2 97%   BMI 26.61 kg/m²   Wt Readings from Last 3 Encounters:   02/21/23 175 lb (79.4 kg)   12/06/22 187 lb 6.4 oz (85 kg)   11/21/22 183 lb 12.8 oz (83.4 kg)     Body mass index is 26.61 kg/m². Vitals:    02/21/23 1521   BP: 120/78   Pulse: 73   SpO2: 97%        General Appearance:  No distress, conversant  Constitutional:  Well developed, Well nourished, No acute distress, Non-toxic appearance. HENT:  Normocephalic, Atraumatic, Bilateral external ears normal, Oropharynx moist, No oral exudates, Nose normal. Neck- Normal range of motion, No tenderness, Supple, No stridor,no apical-carotid delay  Eyes:  PERRL, EOMI, Conjunctiva normal, No discharge. Respiratory:  Normal breath sounds, No respiratory distress, No wheezing, No chest tenderness. ,no use of accessory muscles, NO crackles  Cardiovascular: (PMI) apex non displaced,no lifts no thrills,S1 and S2 audible, No added heart sounds, No signs of ankle edema, or volume overload, No evidence of JVD, No crackles   GI:  Bowel sounds normal, Soft, No tenderness, No masses, No gross visceromegaly   :  No costovertebral angle tenderness   Musculoskeletal:  No edema, no tenderness, no deformities.  Back- no tenderness  Integument:  Well hydrated, no rash   Lymphatic:  No lymphadenopathy noted   Neurologic:  Alert & oriented x 3, CN 2-12 normal, normal motor function, normal sensory function, no focal deficits noted   Psychiatric:  Speech and behavior appropriate       Medical decision making and Data review:  DATA:  No results found for: TROPONINT  BNP:  No results found for: PROBNP  PT/INR:  No results found for: PTINR  No results found for: LABA1C  Lab Results   Component Value Date    CHOL 215 (H) 12/02/2020    TRIG 60 12/02/2020    HDL 82 04/16/2022    LDLCALC 102 (H) 04/16/2022    LDLDIRECT 113 (H) 12/02/2020     Lab Results   Component Value Date    ALT 12 04/16/2022    AST 19 04/16/2022     No results for input(s): WBC, HGB, HCT, MCV, PLT in the last 72 hours.  TSH:   Lab Results   Component Value Date    TSH 0.30 12/06/2019     Lab Results   Component Value Date    AST 19 04/16/2022    ALT 12 04/16/2022    BILITOT 0.6 04/16/2022    ALKPHOS 88 04/16/2022     No results found for: PROBNP  No results found for: LABA1C  Lab Results   Component Value Date    WBC 3.9 (L) 04/16/2022    HGB 14.0 04/16/2022    HCT 44.1 04/16/2022     04/16/2022          Echo 2/20/2023   Left ventricular function and size is normal, EF is estimated at 55-60%.   Mild left ventricular hypertrophy.   Grade I diastolic dysfunction.   No regional wall motion abnormalities were detected.   Mild mitral, tricuspid and moderate aortic regurgitation is present.   RVSP is 35 mmHg.   No evidence of pericardial effusion.    [place] 2/20/2023  Summary   ECG portion of stress test is positive for ischemia by diagnostic criteria.   Completed 8.5 METS and 7 Mins of exercise   Abnormal stress test   Consider lexiscan or cardiac cta      All labs, medications and tests reviewed by myself including data and history from outside source , patient and available family .  Assessment & Plan:      1. Heart palpitations    2. Graves disease    3. Postoperative hypothyroidism           Heart palpitations   Which showed PVCs and bigeminy's mostly in the evening time?  Twice to cut down on vitamin supplements and water flavor additives.  She did not get Get 30-day monitor.TSH has been normal stress test to rule out ischemia as etiology and see PVC burden with exercise and stress  Urged her to get monitor  before she leaves for vacation on march 8th so that we can decide about beta blockers she will also need a stress test   Start metoprolol 25 mg bid      Hypothyroidism   History of Graves' disease s/p ablation currently on levothyroxine TSH is normal.  Went over the risk of A. fib only if her TSH is abnormal     Dyslipidemia :  All available lab work was reviewed. Patient was advised to repeat lab work before next visit. Necessary orders were placed , instructions given by myself       Counseled extensively and medication compliance urged. We discussed that for the  prevention of ASCVD our  goal is aggressive risk modification. Patient is encouraged to exercise if they can , educated about  brisk walk for 30 minutes  at least 3 to 4 times a week if there are no physical limitations  Various goals were discussed and questions answered. Continue current medications. Appropriate prescriptions are addressed and refills ordered. Questions answered and patient verbalizes understanding. Call for any problems, questions, or concerns. Greater than 60 % of time spent counseling besides reviewing data and images     Continue all other medications of all above medical condition listed as is. Return in about 1 month (around 3/21/2023). Please note this report has been partially produced using speech recognition software and may contain errors related to that system including errors in grammar, punctuation, and spelling, as well as words and phrases that may be inappropriate. If there are any questions or concerns please feel free to contact the dictating provider for clarification.

## 2023-02-24 ENCOUNTER — PROCEDURE VISIT (OUTPATIENT)
Dept: CARDIOLOGY CLINIC | Age: 63
End: 2023-02-24

## 2023-02-24 ENCOUNTER — TELEPHONE (OUTPATIENT)
Dept: CARDIOLOGY CLINIC | Age: 63
End: 2023-02-24

## 2023-02-24 DIAGNOSIS — R94.31 ABNORMAL ECG DURING EXERCISE STRESS TEST: ICD-10-CM

## 2023-02-24 LAB
LV EF: 68 %
LVEF MODALITY: NORMAL

## 2023-02-24 NOTE — TELEPHONE ENCOUNTER
Patient will increase metoprolol to 50mg and report back next week. Patient declines monitor as she is going out of country.

## 2023-02-24 NOTE — TELEPHONE ENCOUNTER
Pt is here for her scheduled NucMed. stress test this am. Francie Milan started her on Lopressor 25 mg twice daily. However she is still having palpitations, which is bothersome for pt. Thus routing this encounter to [address] [place] [address]. MA to check w/. Pt is leaving for [place] on 3/8/2023 & will not return until 3/25th. She would like a response before she leaves on her trip.

## 2023-02-28 ENCOUNTER — TELEPHONE (OUTPATIENT)
Dept: CARDIOLOGY CLINIC | Age: 63
End: 2023-02-28

## 2023-03-01 ENCOUNTER — TELEPHONE (OUTPATIENT)
Dept: CARDIOLOGY CLINIC | Age: 63
End: 2023-03-01

## 2023-03-01 NOTE — TELEPHONE ENCOUNTER
Summary    Supervising physician Dr. Jacqueline Venegas . Normal EF 68 % with normal ventricular contractility. No infarct or ischemia    noted. Normal stress myocardial perfusion. This is a normal study. Spoke to pt regarding results. Patient advised and voices understanding.     She is leaving the country on Sunday and will return on 03/25/23, wants to know if she needs to continue with the magnesium

## 2023-03-02 RX ORDER — METOPROLOL TARTRATE 50 MG/1
50 TABLET, FILM COATED ORAL 2 TIMES DAILY
Qty: 60 TABLET | Refills: 5 | Status: SHIPPED | OUTPATIENT
Start: 2023-03-02

## 2023-03-02 RX ORDER — MAGNESIUM OXIDE 400 MG/1
400 TABLET ORAL DAILY
Qty: 30 TABLET | Refills: 4 | Status: SHIPPED | OUTPATIENT
Start: 2023-03-02

## 2023-03-02 RX ORDER — MAGNESIUM OXIDE 400 MG/1
400 TABLET ORAL DAILY
Qty: 30 TABLET | Refills: 1 | Status: CANCELLED | OUTPATIENT
Start: 2023-03-02

## 2023-03-28 ENCOUNTER — OFFICE VISIT (OUTPATIENT)
Dept: CARDIOLOGY CLINIC | Age: 63
End: 2023-03-28
Payer: MEDICARE

## 2023-03-28 ENCOUNTER — NURSE ONLY (OUTPATIENT)
Dept: CARDIOLOGY CLINIC | Age: 63
End: 2023-03-28

## 2023-03-28 VITALS
HEART RATE: 71 BPM | DIASTOLIC BLOOD PRESSURE: 80 MMHG | BODY MASS INDEX: 26.61 KG/M2 | SYSTOLIC BLOOD PRESSURE: 122 MMHG | HEIGHT: 68 IN | OXYGEN SATURATION: 95 % | WEIGHT: 175.6 LBS

## 2023-03-28 DIAGNOSIS — E89.0 POSTOPERATIVE HYPOTHYROIDISM: Primary | ICD-10-CM

## 2023-03-28 DIAGNOSIS — E05.00 GRAVES DISEASE: ICD-10-CM

## 2023-03-28 DIAGNOSIS — R00.2 HEART PALPITATIONS: ICD-10-CM

## 2023-03-28 PROCEDURE — G8427 DOCREV CUR MEDS BY ELIG CLIN: HCPCS | Performed by: INTERNAL MEDICINE

## 2023-03-28 PROCEDURE — 3017F COLORECTAL CA SCREEN DOC REV: CPT | Performed by: INTERNAL MEDICINE

## 2023-03-28 PROCEDURE — 1090F PRES/ABSN URINE INCON ASSESS: CPT | Performed by: INTERNAL MEDICINE

## 2023-03-28 PROCEDURE — 1123F ACP DISCUSS/DSCN MKR DOCD: CPT | Performed by: INTERNAL MEDICINE

## 2023-03-28 PROCEDURE — G8484 FLU IMMUNIZE NO ADMIN: HCPCS | Performed by: INTERNAL MEDICINE

## 2023-03-28 PROCEDURE — 99214 OFFICE O/P EST MOD 30 MIN: CPT | Performed by: INTERNAL MEDICINE

## 2023-03-28 PROCEDURE — 1036F TOBACCO NON-USER: CPT | Performed by: INTERNAL MEDICINE

## 2023-03-28 PROCEDURE — G8399 PT W/DXA RESULTS DOCUMENT: HCPCS | Performed by: INTERNAL MEDICINE

## 2023-03-28 PROCEDURE — G8417 CALC BMI ABV UP PARAM F/U: HCPCS | Performed by: INTERNAL MEDICINE

## 2023-03-28 NOTE — PATIENT INSTRUCTIONS
Please be informed that if you contact our office outside of normal business hours the physician on call cannot help with any scheduling or rescheduling issues, procedure instruction questions or any type of medication issue. We advise you for any urgent/emergency that you go to the nearest emergency room! PLEASE CALL OUR OFFICE DURING NORMAL BUSINESS HOURS    Monday - Friday   8 am to 5 pm    [place] [place] [address]: [phone]    [place]:  [phone]        **It is YOUR responsibilty to bring medication bottles and/or updated medication list to [address] [place] [address]. This will allow us to better serve you and all your healthcare needs**        Thank you for allowing us to care for you today! We want to ensure we can follow your treatment plan and we strive to give you the best outcomes and experience possible. If you ever have a life threatening emergency and call 911 - for an ambulance (EMS)   Our providers can only care for you at:   [facility] or [facility]. Even if you have someone take you or you drive yourself we can only care for you in a [facility] facility. Our providers are not setup at the other healthcare locations!

## 2023-03-28 NOTE — PROGRESS NOTES
No current facility-administered medications for this visit. 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 Cancer ([address]     back of arm right    Difficult intubation     Graves disease     Hypothyroidism     IUD (intrauterine device) in place 02/25/2008    Mirena    Overactive bladder     Pap smear for cervical cancer screening 06/03/2010    Neg    Pap smear for cervical cancer screening 12/02/2010    Neg    Pap smear for cervical cancer screening 12/06/2011    Neg    Vitamin B 12 deficiency      Past Surgical History:   Procedure Laterality Date    APPENDECTOMY  9/22/15    laprascopic    COLONOSCOPY  08/2008    Neg    COLONOSCOPY  07/03/2019    polyps divertics 5-10 year repeat    COLONOSCOPY N/A 7/3/2019    COLONOSCOPY POLYPECTOMY SNARE/COLD BIOPSY performed by Everett Flores MD at [address] [place] [address]  12/2005    HGSIL    COLPOSCOPY  12/2007    HPV, poss. mild dysplasia    GYNECOLOGIC CRYOSURGERY  02/09/2006    KNEE ARTHROSCOPY Right 11/2009    Dr. Joseph Antunez Right     arm    TOTAL HIP ARTHROPLASTY      WRIST GANGLION EXCISION Left      Family History   Problem Relation Age of Onset    Heart Failure Mother     Cancer Maternal Aunt     Stroke Maternal Grandmother     Breast Cancer Neg Hx     Ovarian Cancer Neg Hx      Social History     Tobacco Use    Smoking status: Never    Smokeless tobacco: Never   Substance Use Topics    Alcohol use: Not Currently     Comment: occ. Review of Systems:   Constitutional: No Fever or Weight Loss   Eyes: No Decreased Vision  ENT: No Headaches, Hearing Loss or Vertigo  Cardiovascular: as per note above   Respiratory: No cough or wheezing and as per note above.    Gastrointestinal: No abdominal pain, appetite loss, blood in stools, constipation, diarrhea or heartburn  Genitourinary: No dysuria,

## 2023-05-30 ENCOUNTER — OFFICE VISIT (OUTPATIENT)
Dept: CARDIOLOGY CLINIC | Age: 63
End: 2023-05-30
Payer: MEDICARE

## 2023-05-30 VITALS
HEIGHT: 68 IN | BODY MASS INDEX: 26.98 KG/M2 | HEART RATE: 68 BPM | WEIGHT: 178 LBS | OXYGEN SATURATION: 98 % | SYSTOLIC BLOOD PRESSURE: 122 MMHG | DIASTOLIC BLOOD PRESSURE: 82 MMHG

## 2023-05-30 DIAGNOSIS — R00.2 HEART PALPITATIONS: ICD-10-CM

## 2023-05-30 DIAGNOSIS — E89.0 POSTOPERATIVE HYPOTHYROIDISM: Primary | ICD-10-CM

## 2023-05-30 DIAGNOSIS — E05.00 GRAVES DISEASE: ICD-10-CM

## 2023-05-30 PROCEDURE — 1036F TOBACCO NON-USER: CPT | Performed by: INTERNAL MEDICINE

## 2023-05-30 PROCEDURE — G8399 PT W/DXA RESULTS DOCUMENT: HCPCS | Performed by: INTERNAL MEDICINE

## 2023-05-30 PROCEDURE — G8427 DOCREV CUR MEDS BY ELIG CLIN: HCPCS | Performed by: INTERNAL MEDICINE

## 2023-05-30 PROCEDURE — 1090F PRES/ABSN URINE INCON ASSESS: CPT | Performed by: INTERNAL MEDICINE

## 2023-05-30 PROCEDURE — G8417 CALC BMI ABV UP PARAM F/U: HCPCS | Performed by: INTERNAL MEDICINE

## 2023-05-30 PROCEDURE — 99214 OFFICE O/P EST MOD 30 MIN: CPT | Performed by: INTERNAL MEDICINE

## 2023-05-30 PROCEDURE — 3017F COLORECTAL CA SCREEN DOC REV: CPT | Performed by: INTERNAL MEDICINE

## 2023-05-30 PROCEDURE — 1123F ACP DISCUSS/DSCN MKR DOCD: CPT | Performed by: INTERNAL MEDICINE

## 2023-05-30 RX ORDER — METOPROLOL SUCCINATE 50 MG/1
50 TABLET, EXTENDED RELEASE ORAL DAILY
Qty: 30 TABLET | Refills: 3 | Status: SHIPPED | OUTPATIENT
Start: 2023-05-30 | End: 2023-05-30

## 2023-05-30 RX ORDER — METOPROLOL SUCCINATE 50 MG/1
50 TABLET, EXTENDED RELEASE ORAL DAILY
Qty: 90 TABLET | Refills: 3 | Status: SHIPPED | OUTPATIENT
Start: 2023-05-30

## 2023-05-30 NOTE — PROGRESS NOTES
CARDIOLOGY  NOTE    Chief Complaint: Palpitations    HPI:   Holden Redding is a 70y.o. year old who has Past medical history as noted below. She comes in for evaluation due to intermittent palpitations she describes them as the same sensations that she had when her thyroid was abnormal.   Metoprolol has helped a lot On treadmill she had Ischemia therefore she got lexiscan which was normal  she just returned from a cruise where she feels alcohol was contributing to symptoms but  she notices it more at night  when she is laying down  She feels metoprolol 50 mg twice a day has helped but not completely resolved the problem however her 30 day monitor was normal   She prefers to take once a day meds     Had hyperthyroid in 20's and then had ablation in 2012 has been on thyroid replacment   Notices some palpitations mostly in the evening when she is more relaxed on Holter monitor where she was noted to have PVCs with bigeminy.   She reports intermittent palpitations for some time now but they are gradually more symptomatic they can last few seconds she does drink a flavor additive to her water which has multivitamin excluding vitamin B12 her vitamin B12 levels have been elevated  Does not drink any alcohol energy drinks or coffee drinks rare ice soda there is no family history of heart problems  He does physically demanding work Works as a  her thyroid functions have been generally well controlled    Current Outpatient Medications   Medication Sig Dispense Refill    metoprolol succinate (TOPROL XL) 50 MG extended release tablet Take 1 tablet by mouth daily 90 tablet 3    magnesium oxide (MAG-OX) 400 MG tablet Take 1 tablet by mouth daily 30 tablet 4    fluticasone (FLONASE) 50 MCG/ACT nasal spray 2 sprays by Each Nostril route daily 3 each 1    SYNTHROID 112 MCG tablet TAKE 1 TABLET EVERY MORNING 90 tablet 1    oxybutynin (DITROPAN XL) 15 MG extended release tablet TAKE 2

## 2023-05-30 NOTE — PATIENT INSTRUCTIONS
**It is YOUR responsibilty to bring medication bottles and/or updated medication list to [address] [place] [address]. This will allow us to better serve you and all your healthcare needs**    [facility] Locations - No appointment necessary. Sites open Monday to Friday. Call your preferred location for test preparation, business   hours and other information you need. [employer] accepts [employer]. [address] Celena Galvez, [address] [facility]  Phone: [phone] Aleksandra [place]i  [address] [facility]  [address] Aleksandra vivas, [address]  Phone: [phone]       Please be informed that if you contact our office outside of normal business hours the physician on call cannot help with any scheduling or rescheduling issues, procedure instruction questions or any type of medication issue. We advise you for any urgent/emergency that you go to the nearest emergency room! PLEASE CALL OUR OFFICE DURING NORMAL BUSINESS HOURS    Monday - Friday   8 am to 5 pm    [facility] [place] [address]: [phone]    [place]:  [phone]    Thank you for allowing us to care for you today! We want to ensure we can follow your treatment plan and we strive to give you the best outcomes and experience possible. If you ever have a life threatening emergency and call 911 - for an ambulance (EMS)   Our providers can only care for you at:   [facility] or [facility]. Even if you have someone take you or you drive yourself we can only care for you in a [address] [facility] facility. Our providers are not setup at the other healthcare locations!

## 2023-06-16 DIAGNOSIS — E89.0 POSTOPERATIVE HYPOTHYROIDISM: ICD-10-CM

## 2023-06-16 DIAGNOSIS — N32.81 OVERACTIVE BLADDER: ICD-10-CM

## 2023-06-16 RX ORDER — OXYBUTYNIN CHLORIDE 15 MG/1
15 TABLET, EXTENDED RELEASE ORAL 2 TIMES DAILY
Qty: 180 TABLET | Refills: 1 | Status: SHIPPED | OUTPATIENT
Start: 2023-06-16 | End: 2023-12-13

## 2023-06-16 RX ORDER — OXYBUTYNIN CHLORIDE 15 MG/1
TABLET, EXTENDED RELEASE ORAL
Qty: 180 TABLET | Refills: 1 | Status: SHIPPED | OUTPATIENT
Start: 2023-06-16 | End: 2023-06-16 | Stop reason: SDUPTHER

## 2023-06-16 RX ORDER — LEVOTHYROXINE SODIUM 112 UG/1
112 TABLET ORAL EVERY MORNING
Qty: 90 TABLET | Refills: 1 | Status: SHIPPED | OUTPATIENT
Start: 2023-06-16

## 2023-06-16 RX ORDER — LEVOTHYROXINE SODIUM 112 UG/1
112 TABLET ORAL DAILY
Qty: 90 TABLET | Refills: 1 | Status: SHIPPED | OUTPATIENT
Start: 2023-06-16 | End: 2023-06-16 | Stop reason: SDUPTHER

## 2023-07-19 SDOH — ECONOMIC STABILITY: FOOD INSECURITY: WITHIN THE PAST 12 MONTHS, YOU WORRIED THAT YOUR FOOD WOULD RUN OUT BEFORE YOU GOT MONEY TO BUY MORE.: NEVER TRUE

## 2023-07-19 SDOH — ECONOMIC STABILITY: INCOME INSECURITY: HOW HARD IS IT FOR YOU TO PAY FOR THE VERY BASICS LIKE FOOD, HOUSING, MEDICAL CARE, AND HEATING?: NOT VERY HARD

## 2023-07-19 SDOH — ECONOMIC STABILITY: FOOD INSECURITY: WITHIN THE PAST 12 MONTHS, THE FOOD YOU BOUGHT JUST DIDN'T LAST AND YOU DIDN'T HAVE MONEY TO GET MORE.: NEVER TRUE

## 2023-07-19 ASSESSMENT — PATIENT HEALTH QUESTIONNAIRE - PHQ9
2. FEELING DOWN, DEPRESSED OR HOPELESS: NOT AT ALL
SUM OF ALL RESPONSES TO PHQ9 QUESTIONS 1 & 2: 0
SUM OF ALL RESPONSES TO PHQ QUESTIONS 1-9: 0
1. LITTLE INTEREST OR PLEASURE IN DOING THINGS: NOT AT ALL
SUM OF ALL RESPONSES TO PHQ QUESTIONS 1-9: 0
2. FEELING DOWN, DEPRESSED OR HOPELESS: 0
SUM OF ALL RESPONSES TO PHQ9 QUESTIONS 1 & 2: 0
1. LITTLE INTEREST OR PLEASURE IN DOING THINGS: 0

## 2023-07-20 ENCOUNTER — OFFICE VISIT (OUTPATIENT)
Dept: FAMILY MEDICINE CLINIC | Age: 63
End: 2023-07-20
Payer: MEDICARE

## 2023-07-20 VITALS
OXYGEN SATURATION: 99 % | SYSTOLIC BLOOD PRESSURE: 112 MMHG | HEIGHT: 68 IN | DIASTOLIC BLOOD PRESSURE: 68 MMHG | WEIGHT: 176 LBS | BODY MASS INDEX: 26.67 KG/M2 | HEART RATE: 59 BPM

## 2023-07-20 DIAGNOSIS — E78.2 MODERATE MIXED HYPERLIPIDEMIA NOT REQUIRING STATIN THERAPY: ICD-10-CM

## 2023-07-20 DIAGNOSIS — J30.2 SEASONAL ALLERGIES: ICD-10-CM

## 2023-07-20 DIAGNOSIS — Z85.820 HISTORY OF MELANOMA: ICD-10-CM

## 2023-07-20 DIAGNOSIS — E89.0 POSTOPERATIVE HYPOTHYROIDISM: Primary | ICD-10-CM

## 2023-07-20 DIAGNOSIS — E89.0 POSTOPERATIVE HYPOTHYROIDISM: ICD-10-CM

## 2023-07-20 PROCEDURE — 3017F COLORECTAL CA SCREEN DOC REV: CPT | Performed by: STUDENT IN AN ORGANIZED HEALTH CARE EDUCATION/TRAINING PROGRAM

## 2023-07-20 PROCEDURE — G8399 PT W/DXA RESULTS DOCUMENT: HCPCS | Performed by: STUDENT IN AN ORGANIZED HEALTH CARE EDUCATION/TRAINING PROGRAM

## 2023-07-20 PROCEDURE — G8427 DOCREV CUR MEDS BY ELIG CLIN: HCPCS | Performed by: STUDENT IN AN ORGANIZED HEALTH CARE EDUCATION/TRAINING PROGRAM

## 2023-07-20 PROCEDURE — 1090F PRES/ABSN URINE INCON ASSESS: CPT | Performed by: STUDENT IN AN ORGANIZED HEALTH CARE EDUCATION/TRAINING PROGRAM

## 2023-07-20 PROCEDURE — 1123F ACP DISCUSS/DSCN MKR DOCD: CPT | Performed by: STUDENT IN AN ORGANIZED HEALTH CARE EDUCATION/TRAINING PROGRAM

## 2023-07-20 PROCEDURE — 1036F TOBACCO NON-USER: CPT | Performed by: STUDENT IN AN ORGANIZED HEALTH CARE EDUCATION/TRAINING PROGRAM

## 2023-07-20 PROCEDURE — 99214 OFFICE O/P EST MOD 30 MIN: CPT | Performed by: STUDENT IN AN ORGANIZED HEALTH CARE EDUCATION/TRAINING PROGRAM

## 2023-07-20 PROCEDURE — G8417 CALC BMI ABV UP PARAM F/U: HCPCS | Performed by: STUDENT IN AN ORGANIZED HEALTH CARE EDUCATION/TRAINING PROGRAM

## 2023-07-20 RX ORDER — FLUTICASONE PROPIONATE 50 MCG
2 SPRAY, SUSPENSION (ML) NASAL DAILY
Qty: 3 EACH | Refills: 1 | Status: SHIPPED | OUTPATIENT
Start: 2023-07-20

## 2023-07-20 NOTE — PROGRESS NOTES
appearance. HENT:      Head: Normocephalic and atraumatic. Eyes:      Extraocular Movements: Extraocular movements intact. Pupils: Pupils are equal, round, and reactive to light. Cardiovascular:      Rate and Rhythm: Normal rate and regular rhythm. Pulses: Normal pulses. Heart sounds: No murmur heard. No friction rub. No gallop. Skin:     General: Skin is warm and dry. Neurological:      General: No focal deficit present. Mental Status: She is alert. Psychiatric:         Mood and Affect: Mood normal.         Behavior: Behavior normal.       ASSESSMENT & PLAN    1. Seasonal allergies    - fluticasone (FLONASE) 50 MCG/ACT nasal spray; 2 sprays by Each Nostril route daily  Dispense: 3 each; Refill: 1    2. Postoperative hypothyroidism    - TSH with Reflex to FT4; Future    3. Moderate mixed hyperlipidemia not requiring statin therapy    - LIPID PANEL; Future  - Comprehensive Metabolic Panel; Future    4. History of melanoma    Thyroid well controlled  Follwos with Terence gutierrez melanoma  Due for labs  Return in about 6 months (around 1/20/2024).          Electronically signed by Pravin Scherer DO on 7/20/2023

## 2023-07-21 LAB
ALBUMIN SERPL-MCNC: 4.2 G/DL (ref 3.4–5)
ALBUMIN/GLOB SERPL: 1.6 {RATIO} (ref 1.1–2.2)
ALP SERPL-CCNC: 84 U/L (ref 40–129)
ALT SERPL-CCNC: 16 U/L (ref 10–40)
ANION GAP SERPL CALCULATED.3IONS-SCNC: 10 MMOL/L (ref 3–16)
AST SERPL-CCNC: 23 U/L (ref 15–37)
BILIRUB SERPL-MCNC: 0.8 MG/DL (ref 0–1)
BUN SERPL-MCNC: 23 MG/DL (ref 7–20)
CALCIUM SERPL-MCNC: 9.5 MG/DL (ref 8.3–10.6)
CHLORIDE SERPL-SCNC: 106 MMOL/L (ref 99–110)
CHOLEST SERPL-MCNC: 194 MG/DL (ref 0–199)
CO2 SERPL-SCNC: 26 MMOL/L (ref 21–32)
CREAT SERPL-MCNC: 0.8 MG/DL (ref 0.6–1.2)
GFR SERPLBLD CREATININE-BSD FMLA CKD-EPI: >60 ML/MIN/{1.73_M2}
GLUCOSE SERPL-MCNC: 79 MG/DL (ref 70–99)
HDLC SERPL-MCNC: 72 MG/DL (ref 40–60)
LDLC SERPL CALC-MCNC: 105 MG/DL
POTASSIUM SERPL-SCNC: 4.2 MMOL/L (ref 3.5–5.1)
PROT SERPL-MCNC: 6.9 G/DL (ref 6.4–8.2)
SODIUM SERPL-SCNC: 142 MMOL/L (ref 136–145)
TRIGL SERPL-MCNC: 87 MG/DL (ref 0–150)
TSH SERPL DL<=0.005 MIU/L-ACNC: 2.37 UIU/ML (ref 0.27–4.2)
VLDLC SERPL CALC-MCNC: 17 MG/DL

## 2023-07-24 ASSESSMENT — ENCOUNTER SYMPTOMS
SHORTNESS OF BREATH: 0
WHEEZING: 0
SORE THROAT: 0
NAUSEA: 0
ABDOMINAL PAIN: 0

## 2023-10-03 RX ORDER — MAGNESIUM OXIDE 400 MG/1
1 TABLET ORAL DAILY
Qty: 30 TABLET | Refills: 5 | Status: SHIPPED | OUTPATIENT
Start: 2023-10-03 | End: 2023-10-04 | Stop reason: SDUPTHER

## 2023-10-04 RX ORDER — METOPROLOL SUCCINATE 50 MG/1
50 TABLET, EXTENDED RELEASE ORAL DAILY
Qty: 90 TABLET | Refills: 3 | Status: SHIPPED | OUTPATIENT
Start: 2023-10-04

## 2023-10-04 RX ORDER — MAGNESIUM OXIDE 400 MG/1
1 TABLET ORAL DAILY
Qty: 90 TABLET | Refills: 3 | Status: SHIPPED | OUTPATIENT
Start: 2023-10-04

## 2024-01-22 ENCOUNTER — OFFICE VISIT (OUTPATIENT)
Dept: FAMILY MEDICINE CLINIC | Age: 64
End: 2024-01-22

## 2024-01-22 VITALS
WEIGHT: 185.4 LBS | BODY MASS INDEX: 28.1 KG/M2 | HEART RATE: 57 BPM | DIASTOLIC BLOOD PRESSURE: 80 MMHG | OXYGEN SATURATION: 96 % | SYSTOLIC BLOOD PRESSURE: 106 MMHG | HEIGHT: 68 IN

## 2024-01-22 DIAGNOSIS — R00.2 PALPITATIONS: ICD-10-CM

## 2024-01-22 DIAGNOSIS — J30.1 SEASONAL ALLERGIC RHINITIS DUE TO POLLEN: Chronic | ICD-10-CM

## 2024-01-22 DIAGNOSIS — E89.0 POSTOPERATIVE HYPOTHYROIDISM: ICD-10-CM

## 2024-01-22 DIAGNOSIS — E53.8 VITAMIN B 12 DEFICIENCY: Primary | ICD-10-CM

## 2024-01-22 DIAGNOSIS — Z23 NEED FOR PROPHYLACTIC VACCINATION AGAINST STREPTOCOCCUS PNEUMONIAE (PNEUMOCOCCUS): ICD-10-CM

## 2024-01-22 DIAGNOSIS — E78.2 MODERATE MIXED HYPERLIPIDEMIA NOT REQUIRING STATIN THERAPY: ICD-10-CM

## 2024-01-22 DIAGNOSIS — M85.80 OSTEOPENIA AFTER MENOPAUSE: ICD-10-CM

## 2024-01-22 DIAGNOSIS — Z85.820 HISTORY OF MELANOMA: ICD-10-CM

## 2024-01-22 DIAGNOSIS — J30.2 SEASONAL ALLERGIES: ICD-10-CM

## 2024-01-22 DIAGNOSIS — E05.00 GRAVES DISEASE: ICD-10-CM

## 2024-01-22 DIAGNOSIS — Z78.0 OSTEOPENIA AFTER MENOPAUSE: ICD-10-CM

## 2024-01-22 RX ORDER — LEVOTHYROXINE SODIUM 112 UG/1
112 TABLET ORAL EVERY MORNING
Qty: 90 TABLET | Refills: 1 | Status: SHIPPED | OUTPATIENT
Start: 2024-01-22

## 2024-01-22 RX ORDER — OXYBUTYNIN CHLORIDE 15 MG/1
15 TABLET, EXTENDED RELEASE ORAL 2 TIMES DAILY
Qty: 180 TABLET | Refills: 1 | Status: SHIPPED | OUTPATIENT
Start: 2024-01-22 | End: 2024-07-20

## 2024-01-22 RX ORDER — METOPROLOL SUCCINATE 50 MG/1
50 TABLET, EXTENDED RELEASE ORAL DAILY
Qty: 90 TABLET | Refills: 3 | Status: SHIPPED | OUTPATIENT
Start: 2024-01-22

## 2024-01-22 RX ORDER — MAGNESIUM OXIDE 400 MG/1
1 TABLET ORAL DAILY
Qty: 90 TABLET | Refills: 3 | Status: SHIPPED | OUTPATIENT
Start: 2024-01-22

## 2024-01-22 RX ORDER — FLUTICASONE PROPIONATE 50 MCG
2 SPRAY, SUSPENSION (ML) NASAL DAILY
Qty: 3 EACH | Refills: 1 | Status: SHIPPED | OUTPATIENT
Start: 2024-01-22

## 2024-01-22 NOTE — PROGRESS NOTES
1/30/2024    Emily New    Chief Complaint   Patient presents with    6 Month Follow-Up     Hypothyroidism        HPI  History was obtained from patient.  Emily is a 63 y.o. female with a PMHx as listed below who presents today for 6 monh follow up. No acute complaints.     Now on metoprolol , pvcs palipitations improeved with meds  Allergies ok  She sees dermatolog regularly   1. Vitamin B 12 deficiency    2. Seasonal allergies    3. Postoperative hypothyroidism    4. Graves disease    5. History of melanoma    6. Palpitations    7. Seasonal allergic rhinitis due to pollen    8. Need for prophylactic vaccination against Streptococcus pneumoniae (pneumococcus)    9. Moderate mixed hyperlipidemia not requiring statin therapy    10. Osteopenia after menopause             REVIEW OF SYMPTOMS    Review of Systems   Constitutional:  Negative for chills and fatigue.   HENT:  Negative for congestion and sore throat.    Respiratory:  Negative for shortness of breath and wheezing.    Cardiovascular:  Negative for chest pain and palpitations.   Gastrointestinal:  Negative for abdominal pain and nausea.   Genitourinary:  Negative for frequency and urgency.   Neurological:  Negative for light-headedness.       PAST MEDICAL HISTORY  Past Medical History:   Diagnosis Date    Abnormal Pap smear of cervix     Aneurysm (HCC)     behind right eye.    Cancer (HCC)     back of arm right, melanoma    Difficult intubation     Graves disease     Hypothyroidism     IUD (intrauterine device) in place 02/25/2008    Mirena    Overactive bladder     Pap smear for cervical cancer screening 06/03/2010    Neg    Pap smear for cervical cancer screening 12/02/2010    Neg    Pap smear for cervical cancer screening 12/06/2011    Neg    Vitamin B 12 deficiency        FAMILY HISTORY  Family History   Problem Relation Age of Onset    Heart Failure Mother     Cancer Maternal Aunt     Stroke Maternal Grandmother     Breast Cancer Neg Hx     Ovarian

## 2024-08-20 DIAGNOSIS — E89.0 POSTOPERATIVE HYPOTHYROIDISM: ICD-10-CM

## 2024-08-20 DIAGNOSIS — R00.2 PALPITATIONS: ICD-10-CM

## 2024-08-20 RX ORDER — OXYBUTYNIN CHLORIDE 15 MG/1
15 TABLET, EXTENDED RELEASE ORAL 2 TIMES DAILY
Qty: 180 TABLET | Refills: 1 | Status: SHIPPED | OUTPATIENT
Start: 2024-08-20 | End: 2025-02-16

## 2024-08-20 RX ORDER — LEVOTHYROXINE SODIUM 112 UG/1
112 TABLET ORAL EVERY MORNING
Qty: 90 TABLET | Refills: 1 | Status: SHIPPED | OUTPATIENT
Start: 2024-08-20

## 2024-09-19 ENCOUNTER — HOSPITAL ENCOUNTER (OUTPATIENT)
Age: 64
Discharge: HOME | End: 2024-09-19
Payer: COMMERCIAL

## 2024-09-19 DIAGNOSIS — E05.00 GRAVES DISEASE: ICD-10-CM

## 2024-09-19 DIAGNOSIS — R00.2 PALPITATIONS: ICD-10-CM

## 2024-09-19 DIAGNOSIS — Z78.0 OSTEOPENIA AFTER MENOPAUSE: ICD-10-CM

## 2024-09-19 DIAGNOSIS — E89.0 POSTOPERATIVE HYPOTHYROIDISM: ICD-10-CM

## 2024-09-19 DIAGNOSIS — E78.2 MODERATE MIXED HYPERLIPIDEMIA NOT REQUIRING STATIN THERAPY: ICD-10-CM

## 2024-09-19 DIAGNOSIS — M85.80 OSTEOPENIA AFTER MENOPAUSE: ICD-10-CM

## 2024-09-19 LAB
25(OH)D3 SERPL-MCNC: 30.1 NG/ML (ref 30–150)
ALBUMIN SERPL-MCNC: 4.6 G/DL (ref 3.4–5)
ALBUMIN/GLOB SERPL: 1.4 {RATIO} (ref 1.1–2.2)
ALP SERPL-CCNC: 105 U/L (ref 40–129)
ALT SERPL-CCNC: 20 U/L (ref 10–40)
ANION GAP SERPL CALCULATED.3IONS-SCNC: 15 MMOL/L (ref 9–17)
AST SERPL-CCNC: 27 U/L (ref 15–37)
BASOPHILS # BLD: 0.04 K/UL
BASOPHILS NFR BLD: 1 % (ref 0–1)
BILIRUB SERPL-MCNC: 1.7 MG/DL (ref 0–1)
BUN SERPL-MCNC: 17 MG/DL (ref 7–20)
CALCIUM SERPL-MCNC: 9.6 MG/DL (ref 8.3–10.6)
CHLORIDE SERPL-SCNC: 103 MMOL/L (ref 99–110)
CHOLEST SERPL-MCNC: 235 MG/DL (ref 125–199)
CO2 SERPL-SCNC: 23 MMOL/L (ref 21–32)
CREAT SERPL-MCNC: 0.7 MG/DL (ref 0.6–1.2)
EOSINOPHIL # BLD: 0.09 K/UL
EOSINOPHILS RELATIVE PERCENT: 2 % (ref 0–3)
ERYTHROCYTE [DISTWIDTH] IN BLOOD BY AUTOMATED COUNT: 12.3 % (ref 11.7–14.9)
FOLATE SERPL-MCNC: 14.7 NG/ML (ref 4.8–24.2)
GFR, ESTIMATED: 88 ML/MIN/1.73M2
GLUCOSE SERPL-MCNC: 82 MG/DL (ref 74–99)
HCT VFR BLD AUTO: 46.8 % (ref 37–47)
HDLC SERPL-MCNC: 95 MG/DL
HGB BLD-MCNC: 15 G/DL (ref 12.5–16)
IMM GRANULOCYTES # BLD AUTO: 0 K/UL
IMM GRANULOCYTES NFR BLD: 0 %
LDLC SERPL CALC-MCNC: 124 MG/DL
LYMPHOCYTES NFR BLD: 1.88 K/UL
LYMPHOCYTES RELATIVE PERCENT: 35 % (ref 24–44)
MCH RBC QN AUTO: 30.7 PG (ref 27–31)
MCHC RBC AUTO-ENTMCNC: 32.1 G/DL (ref 32–36)
MCV RBC AUTO: 95.9 FL (ref 78–100)
MONOCYTES NFR BLD: 0.42 K/UL
MONOCYTES NFR BLD: 8 % (ref 0–4)
NEUTROPHILS NFR BLD: 54 % (ref 36–66)
NEUTS SEG NFR BLD: 2.88 K/UL
PLATELET # BLD AUTO: 224 K/UL (ref 140–440)
PMV BLD AUTO: 11.4 FL (ref 7.5–11.1)
POTASSIUM SERPL-SCNC: 3.8 MMOL/L (ref 3.5–5.1)
PROT SERPL-MCNC: 7.9 G/DL (ref 6.4–8.2)
PTH-INTACT SERPL-MCNC: 46.5 PG/ML (ref 14–72)
RBC # BLD AUTO: 4.88 M/UL (ref 4.2–5.4)
SODIUM SERPL-SCNC: 142 MMOL/L (ref 136–145)
TRIGL SERPL-MCNC: 78 MG/DL
TSH SERPL DL<=0.05 MIU/L-ACNC: 0.95 UIU/ML (ref 0.27–4.2)
VIT B12 SERPL-MCNC: >2000 PG/ML (ref 211–911)
WBC OTHER # BLD: 5.3 K/UL (ref 4–10.5)

## 2024-09-19 PROCEDURE — 83970 ASSAY OF PARATHORMONE: CPT

## 2024-09-19 PROCEDURE — 82746 ASSAY OF FOLIC ACID SERUM: CPT

## 2024-09-19 PROCEDURE — 80053 COMPREHEN METABOLIC PANEL: CPT

## 2024-09-19 PROCEDURE — 82306 VITAMIN D 25 HYDROXY: CPT

## 2024-09-19 PROCEDURE — 85025 COMPLETE CBC W/AUTO DIFF WBC: CPT

## 2024-09-19 PROCEDURE — 82607 VITAMIN B-12: CPT

## 2024-09-19 PROCEDURE — 36415 COLL VENOUS BLD VENIPUNCTURE: CPT

## 2024-09-19 PROCEDURE — 80061 LIPID PANEL: CPT

## 2024-09-19 PROCEDURE — 84443 ASSAY THYROID STIM HORMONE: CPT

## 2024-09-21 SDOH — ECONOMIC STABILITY: FOOD INSECURITY: WITHIN THE PAST 12 MONTHS, THE FOOD YOU BOUGHT JUST DIDN'T LAST AND YOU DIDN'T HAVE MONEY TO GET MORE.: NEVER TRUE

## 2024-09-21 SDOH — ECONOMIC STABILITY: FOOD INSECURITY: WITHIN THE PAST 12 MONTHS, YOU WORRIED THAT YOUR FOOD WOULD RUN OUT BEFORE YOU GOT MONEY TO BUY MORE.: NEVER TRUE

## 2024-09-21 SDOH — ECONOMIC STABILITY: INCOME INSECURITY: HOW HARD IS IT FOR YOU TO PAY FOR THE VERY BASICS LIKE FOOD, HOUSING, MEDICAL CARE, AND HEATING?: NOT HARD AT ALL

## 2024-09-21 ASSESSMENT — PATIENT HEALTH QUESTIONNAIRE - PHQ9
2. FEELING DOWN, DEPRESSED OR HOPELESS: NOT AT ALL
SUM OF ALL RESPONSES TO PHQ QUESTIONS 1-9: 0
1. LITTLE INTEREST OR PLEASURE IN DOING THINGS: NOT AT ALL
SUM OF ALL RESPONSES TO PHQ QUESTIONS 1-9: 0
SUM OF ALL RESPONSES TO PHQ QUESTIONS 1-9: 0
SUM OF ALL RESPONSES TO PHQ9 QUESTIONS 1 & 2: 0
SUM OF ALL RESPONSES TO PHQ QUESTIONS 1-9: 0
1. LITTLE INTEREST OR PLEASURE IN DOING THINGS: NOT AT ALL
SUM OF ALL RESPONSES TO PHQ9 QUESTIONS 1 & 2: 0
2. FEELING DOWN, DEPRESSED OR HOPELESS: NOT AT ALL

## 2024-09-24 ENCOUNTER — OFFICE VISIT (OUTPATIENT)
Dept: FAMILY MEDICINE CLINIC | Age: 64
End: 2024-09-24
Payer: COMMERCIAL

## 2024-09-24 VITALS
BODY MASS INDEX: 27.58 KG/M2 | HEIGHT: 68 IN | WEIGHT: 182 LBS | OXYGEN SATURATION: 99 % | SYSTOLIC BLOOD PRESSURE: 136 MMHG | HEART RATE: 70 BPM | DIASTOLIC BLOOD PRESSURE: 80 MMHG

## 2024-09-24 DIAGNOSIS — R00.2 PALPITATIONS: ICD-10-CM

## 2024-09-24 DIAGNOSIS — E53.8 VITAMIN B 12 DEFICIENCY: ICD-10-CM

## 2024-09-24 DIAGNOSIS — E66.3 OVERWEIGHT: ICD-10-CM

## 2024-09-24 DIAGNOSIS — E89.0 POSTOPERATIVE HYPOTHYROIDISM: ICD-10-CM

## 2024-09-24 DIAGNOSIS — Z85.820 HISTORY OF MELANOMA: ICD-10-CM

## 2024-09-24 DIAGNOSIS — J30.2 SEASONAL ALLERGIES: ICD-10-CM

## 2024-09-24 DIAGNOSIS — Z12.31 ENCOUNTER FOR SCREENING MAMMOGRAM FOR MALIGNANT NEOPLASM OF BREAST: ICD-10-CM

## 2024-09-24 DIAGNOSIS — E55.9 VITAMIN D DEFICIENCY: ICD-10-CM

## 2024-09-24 DIAGNOSIS — N32.81 OVERACTIVE BLADDER: Primary | ICD-10-CM

## 2024-09-24 PROCEDURE — 1123F ACP DISCUSS/DSCN MKR DOCD: CPT | Performed by: STUDENT IN AN ORGANIZED HEALTH CARE EDUCATION/TRAINING PROGRAM

## 2024-09-24 PROCEDURE — 99214 OFFICE O/P EST MOD 30 MIN: CPT | Performed by: STUDENT IN AN ORGANIZED HEALTH CARE EDUCATION/TRAINING PROGRAM

## 2024-09-24 RX ORDER — METOPROLOL SUCCINATE 50 MG/1
50 TABLET, EXTENDED RELEASE ORAL DAILY
Qty: 90 TABLET | Refills: 1 | Status: SHIPPED | OUTPATIENT
Start: 2024-09-24

## 2024-09-24 RX ORDER — FLUTICASONE PROPIONATE 50 MCG
2 SPRAY, SUSPENSION (ML) NASAL DAILY
Qty: 3 EACH | Refills: 1 | Status: SHIPPED | OUTPATIENT
Start: 2024-09-24

## 2024-09-24 RX ORDER — OXYBUTYNIN CHLORIDE 15 MG/1
15 TABLET, EXTENDED RELEASE ORAL 2 TIMES DAILY
Qty: 180 TABLET | Refills: 1 | Status: SHIPPED | OUTPATIENT
Start: 2024-09-24 | End: 2025-03-23

## 2024-09-24 RX ORDER — MAGNESIUM OXIDE 400 MG/1
1 TABLET ORAL DAILY
Qty: 90 TABLET | Refills: 1 | Status: SHIPPED | OUTPATIENT
Start: 2024-09-24

## 2024-09-24 RX ORDER — LEVOTHYROXINE SODIUM 112 UG/1
112 TABLET ORAL EVERY MORNING
Qty: 90 TABLET | Refills: 1 | Status: SHIPPED | OUTPATIENT
Start: 2024-09-24

## 2024-09-24 NOTE — PROGRESS NOTES
Take 1 tablet by mouth daily 90 tablet 1    metoprolol succinate (TOPROL XL) 50 MG extended release tablet Take 1 tablet by mouth daily 90 tablet 1    oxyBUTYnin (DITROPAN XL) 15 MG extended release tablet Take 1 tablet by mouth in the morning and at bedtime 180 tablet 1    Cyanocobalamin (B-12 PO) Take 1,500 mcg by mouth every morning        No current facility-administered medications for this visit.       ALLERGIES  Allergies   Allergen Reactions    Latex Rash     tpae    Pollen Extract     Tape [Adhesive Tape] Rash       PHYSICAL EXAM    /80 (Site: Right Upper Arm, Position: Sitting, Cuff Size: Medium Adult)   Pulse 70   Ht 1.727 m (5' 8\")   Wt 82.6 kg (182 lb)   SpO2 99%   BMI 27.67 kg/m²     Physical Exam  Constitutional:       Appearance: Normal appearance.   HENT:      Head: Normocephalic and atraumatic.   Eyes:      Extraocular Movements: Extraocular movements intact.      Pupils: Pupils are equal, round, and reactive to light.   Cardiovascular:      Rate and Rhythm: Normal rate and regular rhythm.      Pulses: Normal pulses.      Heart sounds: No murmur heard.     No friction rub. No gallop.   Pulmonary:      Effort: Pulmonary effort is normal.      Breath sounds: Normal breath sounds.   Skin:     General: Skin is warm and dry.   Neurological:      General: No focal deficit present.      Mental Status: She is alert.   Psychiatric:         Mood and Affect: Mood normal.         Behavior: Behavior normal.         ASSESSMENT & PLAN    1. Seasonal allergies    - fluticasone (FLONASE) 50 MCG/ACT nasal spray; 2 sprays by Each Nostril route daily  Dispense: 3 each; Refill: 1    2. Postoperative hypothyroidism    - levothyroxine (SYNTHROID) 112 MCG tablet; Take 1 tablet by mouth every morning  Dispense: 90 tablet; Refill: 1  - metoprolol succinate (TOPROL XL) 50 MG extended release tablet; Take 1 tablet by mouth daily  Dispense: 90 tablet; Refill: 1    3. Vitamin B 12 deficiency    - magnesium oxide

## 2025-02-10 ENCOUNTER — HOSPITAL ENCOUNTER (OUTPATIENT)
Dept: WOMENS IMAGING | Age: 65
Discharge: HOME | End: 2025-02-10
Payer: MEDICARE

## 2025-02-10 VITALS — HEIGHT: 68 IN | WEIGHT: 175 LBS | BODY MASS INDEX: 26.52 KG/M2

## 2025-02-10 DIAGNOSIS — Z12.31 ENCOUNTER FOR SCREENING MAMMOGRAM FOR MALIGNANT NEOPLASM OF BREAST: ICD-10-CM

## 2025-02-10 PROCEDURE — 77063 BREAST TOMOSYNTHESIS BI: CPT

## 2025-02-11 DIAGNOSIS — R92.30 BREAST DENSITY: Primary | ICD-10-CM

## 2025-03-25 ENCOUNTER — OFFICE VISIT (OUTPATIENT)
Dept: FAMILY MEDICINE CLINIC | Age: 65
End: 2025-03-25
Payer: MEDICARE

## 2025-03-25 VITALS
WEIGHT: 181.9 LBS | HEIGHT: 68 IN | OXYGEN SATURATION: 97 % | BODY MASS INDEX: 27.57 KG/M2 | SYSTOLIC BLOOD PRESSURE: 118 MMHG | HEART RATE: 79 BPM | DIASTOLIC BLOOD PRESSURE: 76 MMHG

## 2025-03-25 DIAGNOSIS — N32.81 OVERACTIVE BLADDER: ICD-10-CM

## 2025-03-25 DIAGNOSIS — E89.0 POSTOPERATIVE HYPOTHYROIDISM: ICD-10-CM

## 2025-03-25 DIAGNOSIS — R00.2 PALPITATIONS: ICD-10-CM

## 2025-03-25 DIAGNOSIS — R00.2 PALPITATIONS: Primary | ICD-10-CM

## 2025-03-25 DIAGNOSIS — J30.2 SEASONAL ALLERGIES: ICD-10-CM

## 2025-03-25 PROCEDURE — 99214 OFFICE O/P EST MOD 30 MIN: CPT | Performed by: STUDENT IN AN ORGANIZED HEALTH CARE EDUCATION/TRAINING PROGRAM

## 2025-03-25 PROCEDURE — G8399 PT W/DXA RESULTS DOCUMENT: HCPCS | Performed by: STUDENT IN AN ORGANIZED HEALTH CARE EDUCATION/TRAINING PROGRAM

## 2025-03-25 PROCEDURE — 1036F TOBACCO NON-USER: CPT | Performed by: STUDENT IN AN ORGANIZED HEALTH CARE EDUCATION/TRAINING PROGRAM

## 2025-03-25 PROCEDURE — G8417 CALC BMI ABV UP PARAM F/U: HCPCS | Performed by: STUDENT IN AN ORGANIZED HEALTH CARE EDUCATION/TRAINING PROGRAM

## 2025-03-25 PROCEDURE — G8427 DOCREV CUR MEDS BY ELIG CLIN: HCPCS | Performed by: STUDENT IN AN ORGANIZED HEALTH CARE EDUCATION/TRAINING PROGRAM

## 2025-03-25 PROCEDURE — 3017F COLORECTAL CA SCREEN DOC REV: CPT | Performed by: STUDENT IN AN ORGANIZED HEALTH CARE EDUCATION/TRAINING PROGRAM

## 2025-03-25 PROCEDURE — 1090F PRES/ABSN URINE INCON ASSESS: CPT | Performed by: STUDENT IN AN ORGANIZED HEALTH CARE EDUCATION/TRAINING PROGRAM

## 2025-03-25 PROCEDURE — 1123F ACP DISCUSS/DSCN MKR DOCD: CPT | Performed by: STUDENT IN AN ORGANIZED HEALTH CARE EDUCATION/TRAINING PROGRAM

## 2025-03-25 RX ORDER — LEVOTHYROXINE SODIUM 112 UG/1
112 TABLET ORAL EVERY MORNING
Qty: 90 TABLET | Refills: 1 | Status: SHIPPED | OUTPATIENT
Start: 2025-03-25 | End: 2025-04-03 | Stop reason: DRUGHIGH

## 2025-03-25 RX ORDER — MELOXICAM 15 MG/1
15 TABLET ORAL DAILY PRN
COMMUNITY
Start: 2025-01-13 | End: 2025-03-25 | Stop reason: SDUPTHER

## 2025-03-25 RX ORDER — OXYBUTYNIN CHLORIDE 15 MG/1
15 TABLET, EXTENDED RELEASE ORAL 2 TIMES DAILY
Qty: 180 TABLET | Refills: 1 | Status: SHIPPED | OUTPATIENT
Start: 2025-03-25 | End: 2025-09-21

## 2025-03-25 RX ORDER — MELOXICAM 15 MG/1
15 TABLET ORAL DAILY PRN
Qty: 90 TABLET | Refills: 0 | Status: SHIPPED | OUTPATIENT
Start: 2025-03-25

## 2025-03-25 SDOH — ECONOMIC STABILITY: FOOD INSECURITY: WITHIN THE PAST 12 MONTHS, THE FOOD YOU BOUGHT JUST DIDN'T LAST AND YOU DIDN'T HAVE MONEY TO GET MORE.: NEVER TRUE

## 2025-03-25 SDOH — ECONOMIC STABILITY: FOOD INSECURITY: WITHIN THE PAST 12 MONTHS, YOU WORRIED THAT YOUR FOOD WOULD RUN OUT BEFORE YOU GOT MONEY TO BUY MORE.: NEVER TRUE

## 2025-03-25 ASSESSMENT — PATIENT HEALTH QUESTIONNAIRE - PHQ9
SUM OF ALL RESPONSES TO PHQ QUESTIONS 1-9: 1
2. FEELING DOWN, DEPRESSED OR HOPELESS: SEVERAL DAYS
1. LITTLE INTEREST OR PLEASURE IN DOING THINGS: NOT AT ALL
SUM OF ALL RESPONSES TO PHQ QUESTIONS 1-9: 1

## 2025-03-25 NOTE — PROGRESS NOTES
3/25/2025    Emily New    Chief Complaint   Patient presents with    6 Month Follow-Up     Pt stated she has been experiencing heart bounding ,weaken legs and inside body shakiness ... Pt father recently past and she can't tell its stress or not that cause body to reacted to the loss of her father.       HPI  Emily is a 65 y.o. female with a PMHx as listed below who presents today for 6 month follow up on chronic conditions. No acute complaints.     Loss of father. Coping with it well. Sudden loss. Currently ging through some grief. Passed away Monday    Physical symptoms heart pounding, shakiness started roughly in December    No specific time of day when symptoms occur, intermittent.   Last episode occurring now  History of Present Illness        1. Palpitations    2. Seasonal allergies    3. Overactive bladder    4. Postoperative hypothyroidism             REVIEW OF SYMPTOMS    Review of Systems   Constitutional:  Negative for chills and fatigue.   HENT:  Negative for congestion and sore throat.    Respiratory:  Negative for shortness of breath and wheezing.    Cardiovascular:  Negative for chest pain and palpitations.   Gastrointestinal:  Negative for abdominal pain and nausea.   Genitourinary:  Negative for frequency and urgency.   Neurological:  Negative for light-headedness.       PAST MEDICAL HISTORY  Past Medical History:   Diagnosis Date    Abnormal Pap smear of cervix     Aneurysm     behind right eye.    Cancer (HCC)     back of arm right, melanoma    Difficult intubation     Graves disease     Hypothyroidism     IUD (intrauterine device) in place 02/25/2008    Mirena    Osteoarthritis     Knees & left thumb    Overactive bladder     Pap smear for cervical cancer screening 06/03/2010    Neg    Pap smear for cervical cancer screening 12/02/2010    Neg    Pap smear for cervical cancer screening 12/06/2011    Neg    Vitamin B 12 deficiency        FAMILY HISTORY  Family History   Problem Relation

## 2025-03-26 LAB
BASOPHILS # BLD: 0 K/UL (ref 0–0.2)
BASOPHILS NFR BLD: 0.9 %
DEPRECATED RDW RBC AUTO: 13.1 % (ref 12.4–15.4)
EOSINOPHIL # BLD: 0.1 K/UL (ref 0–0.6)
EOSINOPHIL NFR BLD: 2.1 %
FOLATE SERPL-MCNC: 11 NG/ML (ref 4.78–24.2)
HCT VFR BLD AUTO: 42.5 % (ref 36–48)
HGB BLD-MCNC: 14.1 G/DL (ref 12–16)
LYMPHOCYTES # BLD: 1.4 K/UL (ref 1–5.1)
LYMPHOCYTES NFR BLD: 40.1 %
MAGNESIUM SERPL-MCNC: 2.17 MG/DL (ref 1.8–2.4)
MCH RBC QN AUTO: 31.3 PG (ref 26–34)
MCHC RBC AUTO-ENTMCNC: 33.2 G/DL (ref 31–36)
MCV RBC AUTO: 94.5 FL (ref 80–100)
MONOCYTES # BLD: 0.3 K/UL (ref 0–1.3)
MONOCYTES NFR BLD: 9.4 %
NEUTROPHILS # BLD: 1.7 K/UL (ref 1.7–7.7)
NEUTROPHILS NFR BLD: 47.5 %
PLATELET # BLD AUTO: 259 K/UL (ref 135–450)
PMV BLD AUTO: 9 FL (ref 5–10.5)
RBC # BLD AUTO: 4.5 M/UL (ref 4–5.2)
VIT B12 SERPL-MCNC: 1942 PG/ML (ref 211–911)
WBC # BLD AUTO: 3.6 K/UL (ref 4–11)

## 2025-03-27 ENCOUNTER — LAB (OUTPATIENT)
Dept: CARDIOLOGY CLINIC | Age: 65
End: 2025-03-27
Payer: MEDICARE

## 2025-03-27 ENCOUNTER — OFFICE VISIT (OUTPATIENT)
Dept: CARDIOLOGY CLINIC | Age: 65
End: 2025-03-27
Payer: MEDICARE

## 2025-03-27 VITALS
DIASTOLIC BLOOD PRESSURE: 78 MMHG | HEART RATE: 78 BPM | SYSTOLIC BLOOD PRESSURE: 114 MMHG | HEIGHT: 68 IN | BODY MASS INDEX: 27.65 KG/M2 | WEIGHT: 182.4 LBS

## 2025-03-27 DIAGNOSIS — E89.0 POSTOPERATIVE HYPOTHYROIDISM: ICD-10-CM

## 2025-03-27 DIAGNOSIS — R00.2 HEART PALPITATIONS: ICD-10-CM

## 2025-03-27 DIAGNOSIS — R00.2 PALPITATIONS: Primary | ICD-10-CM

## 2025-03-27 DIAGNOSIS — E05.00 GRAVES DISEASE: ICD-10-CM

## 2025-03-27 PROCEDURE — 1036F TOBACCO NON-USER: CPT | Performed by: INTERNAL MEDICINE

## 2025-03-27 PROCEDURE — G8427 DOCREV CUR MEDS BY ELIG CLIN: HCPCS | Performed by: INTERNAL MEDICINE

## 2025-03-27 PROCEDURE — 99214 OFFICE O/P EST MOD 30 MIN: CPT | Performed by: INTERNAL MEDICINE

## 2025-03-27 PROCEDURE — 3017F COLORECTAL CA SCREEN DOC REV: CPT | Performed by: INTERNAL MEDICINE

## 2025-03-27 PROCEDURE — 1123F ACP DISCUSS/DSCN MKR DOCD: CPT | Performed by: INTERNAL MEDICINE

## 2025-03-27 PROCEDURE — 1090F PRES/ABSN URINE INCON ASSESS: CPT | Performed by: INTERNAL MEDICINE

## 2025-03-27 PROCEDURE — 93000 ELECTROCARDIOGRAM COMPLETE: CPT | Performed by: INTERNAL MEDICINE

## 2025-03-27 PROCEDURE — G8399 PT W/DXA RESULTS DOCUMENT: HCPCS | Performed by: INTERNAL MEDICINE

## 2025-03-27 PROCEDURE — 36415 COLL VENOUS BLD VENIPUNCTURE: CPT | Performed by: INTERNAL MEDICINE

## 2025-03-27 PROCEDURE — G8417 CALC BMI ABV UP PARAM F/U: HCPCS | Performed by: INTERNAL MEDICINE

## 2025-03-27 PROCEDURE — 1159F MED LIST DOCD IN RCRD: CPT | Performed by: INTERNAL MEDICINE

## 2025-03-27 PROCEDURE — 1160F RVW MEDS BY RX/DR IN RCRD: CPT | Performed by: INTERNAL MEDICINE

## 2025-03-27 RX ORDER — HYDROXYZINE HYDROCHLORIDE 25 MG/1
25 TABLET, FILM COATED ORAL EVERY 8 HOURS PRN
Qty: 30 TABLET | Refills: 2 | Status: SHIPPED | OUTPATIENT
Start: 2025-03-27

## 2025-03-27 NOTE — PROGRESS NOTES
CLINICAL STAFF DOCUMENTATION        Emily New  1960  [id number]    Have you had any Chest Pain recently? - No          Have you had any Shortness of Breath - No    Have you had any dizziness - Yes  When do you feel dizzy? walking  Does the room spin? No  How long does it last .  hours       Have you had any palpitations recently? - Yes  If Yes DO EKG - Do you feel your heart pounding  When did they begin? - last December    How long do they last - .pt states varies.   Is there anything that aggravates or triggers them? Pt denies.   Is there anything that relieves them? Pt denies.   Any thyroid issues? - Yes    Do you have any edema - swelling in No          When did you have your last labs drawn 3/25/2025  What doctor ordered yanci german , do   Do we have the labs in their chart Yes        Do you have a surgery or procedure scheduled in the near future - No      Caffeine? - Yes  How much caffeine? . Very rare / iced tea

## 2025-03-27 NOTE — PROGRESS NOTES
CARDIOLOGY  NOTE    Chief Complaint: Palpitations    HPI:   Emily is a 65 y.o. year old who has Past medical history as noted below.  She is feeling lot of  pounding she feels jittery , she lost her father few months ago   She stopped metoprolol few months ago as it was making her tired   comes in for evaluation due to intermittent palpitations she describes them as the same sensations that she had when her thyroid was abnormal.    On treadmill she had Ischemia therefore she got lexiscan which was normal  .  she notices it more at night  when she is laying down   her 30 day monitor was normal   She prefers to take once a day meds metoprolol was helping but made her tired     Had hyperthyroid in 20's and then had ablation in 2012 has been on thyroid replacment   Notices some palpitations mostly in the evening when she is more relaxed on Holter monitor where she was noted to have PVCs with bigeminy.  She reports intermittent palpitations for some time now but they are gradually more symptomatic they can last few seconds she does drink a flavor additive to her water which has multivitamin excluding vitamin B12 her vitamin B12 levels have been elevated  Does not drink any alcohol energy drinks or coffee drinks rare ice soda there is no family history of heart problems  He does physically demanding work Works as a  her thyroid functions have been generally well controlled    Current Outpatient Medications   Medication Sig Dispense Refill    MAGNESIUM PO Take by mouth      hydrOXYzine HCl (ATARAX) 25 MG tablet Take 1 tablet by mouth every 8 hours as needed for Anxiety 30 tablet 2    oxyBUTYnin (DITROPAN XL) 15 MG extended release tablet Take 1 tablet by mouth in the morning and at bedtime 180 tablet 1    levothyroxine (SYNTHROID) 112 MCG tablet Take 1 tablet by mouth every morning 90 tablet 1    meloxicam (MOBIC) 15 MG tablet Take 1 tablet by mouth daily as needed for

## 2025-03-28 ENCOUNTER — RESULTS FOLLOW-UP (OUTPATIENT)
Dept: CARDIOLOGY | Age: 65
End: 2025-03-28

## 2025-03-28 DIAGNOSIS — E89.0 POSTOPERATIVE HYPOTHYROIDISM: Primary | ICD-10-CM

## 2025-03-28 LAB
ANION GAP SERPL CALCULATED.3IONS-SCNC: 9 MMOL/L (ref 3–16)
BUN SERPL-MCNC: 23 MG/DL (ref 7–20)
CALCIUM SERPL-MCNC: 10 MG/DL (ref 8.3–10.6)
CHLORIDE SERPL-SCNC: 101 MMOL/L (ref 99–110)
CO2 SERPL-SCNC: 26 MMOL/L (ref 21–32)
CREAT SERPL-MCNC: 0.6 MG/DL (ref 0.6–1.2)
GFR SERPLBLD CREATININE-BSD FMLA CKD-EPI: >90 ML/MIN/{1.73_M2}
GLUCOSE SERPL-MCNC: 99 MG/DL (ref 70–99)
POTASSIUM SERPL-SCNC: 5 MMOL/L (ref 3.5–5.1)
SODIUM SERPL-SCNC: 136 MMOL/L (ref 136–145)
T4 FREE SERPL-MCNC: 1.7 NG/DL (ref 0.9–1.8)
TSH SERPL DL<=0.005 MIU/L-ACNC: 0.21 UIU/ML (ref 0.27–4.2)

## 2025-03-31 ENCOUNTER — RESULTS FOLLOW-UP (OUTPATIENT)
Dept: FAMILY MEDICINE CLINIC | Age: 65
End: 2025-03-31

## 2025-03-31 NOTE — TELEPHONE ENCOUNTER
----- Message from HARJEET NICOLE MA sent at 3/31/2025  8:08 AM EDT -----    ----- Message -----  From: Korin Tyler MD  Sent: 3/28/2025   4:12 PM EDT  To: Cat Shaver MA; Lizett Gonzalez MA; #    TSH is abnormal ?  She is having symptoms  To the meds needs adjusted?

## 2025-03-31 NOTE — TELEPHONE ENCOUNTER
Spoke to pt, her symptoms are similar to when she had her thyroid, she also is in a period of distress, loss of parent. She was leaving it up to the lab results and the DrCarlitos To see if it needs adjusted.

## 2025-04-01 NOTE — TELEPHONE ENCOUNTER
Spoke to Belinda Hernandez CNP regarding pt wanting cardiology opinion as well as PCP,Belinda stated they are in agreement with  and he is in charge of pt thyroid treatment. Called pt to advise as Belinda stated, pt voiced understanding

## 2025-04-02 ENCOUNTER — TELEPHONE (OUTPATIENT)
Dept: CARDIOLOGY CLINIC | Age: 65
End: 2025-04-02

## 2025-04-02 ASSESSMENT — ENCOUNTER SYMPTOMS
WHEEZING: 0
SORE THROAT: 0
ABDOMINAL PAIN: 0
SHORTNESS OF BREATH: 0
NAUSEA: 0

## 2025-04-02 NOTE — TELEPHONE ENCOUNTER
Spoke to pt, advised of results and to keep next appt sp provider can go over them with her. Pt voiced understanding      Monitor worn from 27-20 9 March 2025 lowest heart rate was 46 at 12:29 AM average heart rate 79 maximum heart was 127. Ventricular ectopy burden was less than 1% supraventricular ectopy burden was less than 1%. Patient was primarily sinus rhythm patient reported 3 events of palpitations which were associated with this sinus rate of 80 and possible PVC versus artifact on 2 occasions no sustained arrhythmias or abnormality or atrial fibrillation noted

## 2025-04-03 RX ORDER — LEVOTHYROXINE SODIUM 100 UG/1
100 TABLET ORAL DAILY
Qty: 90 TABLET | Refills: 1 | Status: SHIPPED | OUTPATIENT
Start: 2025-04-03

## 2025-06-25 ENCOUNTER — TELEPHONE (OUTPATIENT)
Dept: FAMILY MEDICINE CLINIC | Age: 65
End: 2025-06-25

## 2025-07-08 ENCOUNTER — OFFICE VISIT (OUTPATIENT)
Dept: CARDIOLOGY CLINIC | Age: 65
End: 2025-07-08
Payer: MEDICARE

## 2025-07-08 VITALS
BODY MASS INDEX: 26.07 KG/M2 | HEIGHT: 68 IN | WEIGHT: 172 LBS | SYSTOLIC BLOOD PRESSURE: 116 MMHG | DIASTOLIC BLOOD PRESSURE: 82 MMHG | HEART RATE: 68 BPM

## 2025-07-08 DIAGNOSIS — R00.2 PALPITATIONS: Primary | ICD-10-CM

## 2025-07-08 DIAGNOSIS — I49.1 PAC (PREMATURE ATRIAL CONTRACTION): ICD-10-CM

## 2025-07-08 PROCEDURE — G8399 PT W/DXA RESULTS DOCUMENT: HCPCS | Performed by: NURSE PRACTITIONER

## 2025-07-08 PROCEDURE — 1090F PRES/ABSN URINE INCON ASSESS: CPT | Performed by: NURSE PRACTITIONER

## 2025-07-08 PROCEDURE — G8427 DOCREV CUR MEDS BY ELIG CLIN: HCPCS | Performed by: NURSE PRACTITIONER

## 2025-07-08 PROCEDURE — 3017F COLORECTAL CA SCREEN DOC REV: CPT | Performed by: NURSE PRACTITIONER

## 2025-07-08 PROCEDURE — 1036F TOBACCO NON-USER: CPT | Performed by: NURSE PRACTITIONER

## 2025-07-08 PROCEDURE — 99212 OFFICE O/P EST SF 10 MIN: CPT | Performed by: NURSE PRACTITIONER

## 2025-07-08 PROCEDURE — 1123F ACP DISCUSS/DSCN MKR DOCD: CPT | Performed by: NURSE PRACTITIONER

## 2025-07-08 PROCEDURE — G8417 CALC BMI ABV UP PARAM F/U: HCPCS | Performed by: NURSE PRACTITIONER

## 2025-07-08 NOTE — PROGRESS NOTES
CLINICAL STAFF DOCUMENTATION    Belinda Hernandez, SRINIVAS     Emily New  1960  [id number]    Have you had any Chest Pain recently? - No      Have you had any Shortness of Breath - No      Have you had any dizziness - No      Have you had any palpitations recently? - No    Do you have any edema - swelling in No        Is the patient on any of the following medications - no  If Yes DO EKG - Needs done every 3 months    When did you have your last labs drawn 7/2023  What doctor ordered Rosanna, pt stated had blood work done per rosanna. But only has a active order.   Do we have the labs in their chart Yes      If we do not have these labs, you are retrieve these labs for the provider!    Do you need any prescriptions refilled? - No    Do you have a surgery or procedure scheduled in the near future - No      Do use tobacco products? - No  Do you drink alcohol? - Yes, pt states 1-2 a week   Do you use any illicit drugs? - No  Caffeine? - pt states minimal        Check medication list thoroughly!!! AND RECONCILE OUTSIDE MEDICATIONS  If dose has changed change the entire order not just the MG  BE SURE TO ASK PATIENT IF THEY NEED MEDICATION REFILLS  Verify Pharmacy and update if incorrect    Add to every patient's \"wrap up\" the following dot phrase AFTERVISITCARDIOHEARTHOUSE and ensure we explain this to our patients

## 2025-07-08 NOTE — PROGRESS NOTES
CARDIOLOGY  NOTE    2025    Emily New (:  1960) is a 65 y.o. female,an established patient with Dr. Daurte, here for evaluation of the following chief complaint(s):  3 Month Follow-Up        SUBJECTIVE/OBJECTIVE:  History of Present Illness  The patient presents for palpitations.    The patient reports that her thyroid medication was last adjusted in 2025, following lab work conducted in 2025. She has a plan in place for rechecking her thyroid levels, although no specific appointment has been scheduled yet. She also mentions experiencing significant stress at the time of her initial visit, which she believes may have contributed to her symptoms. However, she notes that her stress levels have since decreased.    she has a history of Patient Active Problem List:     Graves disease     Hypothyroidism     Vitamin B 12 deficiency     Overactive bladder     Seasonal allergic rhinitis due to pollen     Heart palpitations     History of melanoma         Tobacco Use      Smoking status: Never      Smokeless tobacco: Never  . she denies any issues with obtaining taking or side effects from medications.       Review of Systems   Constitutional:  Negative for fatigue and fever.   Respiratory:  Negative for cough and shortness of breath.    Cardiovascular:  Positive for palpitations. Negative for chest pain and leg swelling.   Musculoskeletal:  Negative for arthralgias and gait problem.   Neurological:  Negative for dizziness, syncope, weakness, light-headedness and headaches.       Vitals:    25 1029   BP: 116/82   BP Site: Left Upper Arm   Patient Position: Sitting   BP Cuff Size: Medium Adult   Pulse: 68   Weight: 78 kg (172 lb)   Height: 1.727 m (5' 8\")       Wt Readings from Last 3 Encounters:   25 78 kg (172 lb)   25 82.7 kg (182 lb 6.4 oz)   25 82.5 kg (181 lb 14.4 oz)       BP Readings from Last 3 Encounters:   25 116/82   25 114/78   25 118/76

## 2025-07-08 NOTE — PATIENT INSTRUCTIONS
Thank you for allowing us to care for you today!   We want to ensure we can follow your treatment plan and we strive to give you the best outcomes and experience possible.   If you ever have a life threatening emergency and call 911 - for an ambulance (EMS)  REMEMBER  Our providers can only care for you at:   [facility] or [facility]   Even if you have someone take you or you drive yourself we can only care for you in a [facility] facility. Our providers are not setup at the other healthcare locations!    PLEASE CALL OUR OFFICE DURING NORMAL BUSINESS HOURS  Monday through Friday 8 am to 5 pm  AFTER HOURS the physician on-call cannot help with scheduling, rescheduling, procedure instruction questions or any type of medication need or issue.  [facility] P:[phone] - [facility] P:[phone] - [facility] P:[phone]      If you receive a survey:  We would appreciate you taking the time to share your experience concerning your provider visit in the office.    These surveys are confidential!  We are eager to improve and are counting on you to share your feedback so we can ensure you get the best care possible.

## 2025-07-17 ASSESSMENT — ENCOUNTER SYMPTOMS
COUGH: 0
SHORTNESS OF BREATH: 0

## 2025-09-03 DIAGNOSIS — E89.0 POSTOPERATIVE HYPOTHYROIDISM: ICD-10-CM

## 2025-09-03 DIAGNOSIS — N32.81 OVERACTIVE BLADDER: ICD-10-CM

## 2025-09-05 RX ORDER — OXYBUTYNIN CHLORIDE 15 MG/1
15 TABLET, EXTENDED RELEASE ORAL 2 TIMES DAILY
Qty: 180 TABLET | Refills: 1 | Status: SHIPPED | OUTPATIENT
Start: 2025-09-05 | End: 2026-03-04

## 2025-09-05 RX ORDER — LEVOTHYROXINE SODIUM 100 UG/1
100 TABLET ORAL DAILY
Qty: 90 TABLET | Refills: 1 | Status: SHIPPED | OUTPATIENT
Start: 2025-09-05

## (undated) DEVICE — LINE SAMP O2 6.5FT W/FEMALE CONN F/ADULT CAPNOLINE PLUS

## (undated) DEVICE — JELLY LUBRICATING 3 GM BACTERIOSTATIC

## (undated) DEVICE — KENDALL 500 SERIES DIAPHORETIC FOAM MONITORING ELECTRODE - TEAR DROP SHAPE ( 30/PK): Brand: KENDALL

## (undated) DEVICE — ACUSNARE POLYPECTOMY SNARE: Brand: ACUSNARE

## (undated) DEVICE — TUBING, SUCTION, 3/16" X 6', STRAIGHT: Brand: MEDLINE

## (undated) DEVICE — LINER SUCT CANSTR 1500CC SEMI RIG W/ POR HYDROPHOBIC SHUT

## (undated) DEVICE — BW-412T DISP COMBO CLEANING BRUSH: Brand: SINGLE USE COMBINATION CLEANING BRUSH

## (undated) DEVICE — FORCEPS BX L240CM JAW DIA2.8MM L CAP W/ NDL MIC MESH TOOTH